# Patient Record
Sex: MALE | Race: WHITE | NOT HISPANIC OR LATINO | Employment: OTHER | URBAN - METROPOLITAN AREA
[De-identification: names, ages, dates, MRNs, and addresses within clinical notes are randomized per-mention and may not be internally consistent; named-entity substitution may affect disease eponyms.]

---

## 2017-11-17 ENCOUNTER — ANESTHESIA - HEALTHEAST (OUTPATIENT)
Dept: CARDIOLOGY | Facility: CLINIC | Age: 62
End: 2017-11-17

## 2017-11-20 ENCOUNTER — COMMUNICATION - HEALTHEAST (OUTPATIENT)
Dept: CARDIOLOGY | Facility: CLINIC | Age: 62
End: 2017-11-20

## 2017-11-22 ENCOUNTER — COMMUNICATION - HEALTHEAST (OUTPATIENT)
Dept: CARDIOLOGY | Facility: CLINIC | Age: 62
End: 2017-11-22

## 2017-11-22 DIAGNOSIS — I48.0 PAROXYSMAL ATRIAL FIBRILLATION (H): ICD-10-CM

## 2017-11-24 ENCOUNTER — AMBULATORY - HEALTHEAST (OUTPATIENT)
Dept: CARDIOLOGY | Facility: CLINIC | Age: 62
End: 2017-11-24

## 2017-11-24 DIAGNOSIS — I48.0 PAROXYSMAL ATRIAL FIBRILLATION (H): ICD-10-CM

## 2017-11-24 DIAGNOSIS — I48.92 ATRIAL FLUTTER WITH RAPID VENTRICULAR RESPONSE (H): ICD-10-CM

## 2017-11-27 LAB
ATRIAL RATE - MUSE: 63 BPM
DIASTOLIC BLOOD PRESSURE - MUSE: NORMAL MMHG
INTERPRETATION ECG - MUSE: NORMAL
P AXIS - MUSE: 51 DEGREES
PR INTERVAL - MUSE: 202 MS
QRS DURATION - MUSE: 96 MS
QT - MUSE: 430 MS
QTC - MUSE: 440 MS
R AXIS - MUSE: -18 DEGREES
SYSTOLIC BLOOD PRESSURE - MUSE: NORMAL MMHG
T AXIS - MUSE: -6 DEGREES
VENTRICULAR RATE- MUSE: 63 BPM

## 2017-12-12 ENCOUNTER — OFFICE VISIT - HEALTHEAST (OUTPATIENT)
Dept: CARDIOLOGY | Facility: CLINIC | Age: 62
End: 2017-12-12

## 2017-12-12 DIAGNOSIS — I47.19 ECTOPIC ATRIAL TACHYCARDIA (H): ICD-10-CM

## 2017-12-12 DIAGNOSIS — I48.0 PAROXYSMAL ATRIAL FIBRILLATION (H): ICD-10-CM

## 2017-12-12 DIAGNOSIS — Z86.79 STATUS POST ABLATION OF ATRIAL FIBRILLATION: ICD-10-CM

## 2017-12-12 DIAGNOSIS — I10 ESSENTIAL HYPERTENSION: ICD-10-CM

## 2017-12-12 DIAGNOSIS — Z98.890 STATUS POST ABLATION OF ATRIAL FIBRILLATION: ICD-10-CM

## 2018-03-09 ENCOUNTER — RECORDS - HEALTHEAST (OUTPATIENT)
Dept: LAB | Facility: CLINIC | Age: 63
End: 2018-03-09

## 2018-03-09 LAB
ANION GAP SERPL CALCULATED.3IONS-SCNC: 10 MMOL/L (ref 5–18)
BUN SERPL-MCNC: 16 MG/DL (ref 8–22)
CALCIUM SERPL-MCNC: 8.9 MG/DL (ref 8.5–10.5)
CHLORIDE BLD-SCNC: 104 MMOL/L (ref 98–107)
CK SERPL-CCNC: 72 U/L (ref 30–190)
CO2 SERPL-SCNC: 26 MMOL/L (ref 22–31)
CREAT SERPL-MCNC: 1.04 MG/DL (ref 0.7–1.3)
GFR SERPL CREATININE-BSD FRML MDRD: >60 ML/MIN/1.73M2
GLUCOSE BLD-MCNC: 104 MG/DL (ref 70–125)
MAGNESIUM SERPL-MCNC: 1.9 MG/DL (ref 1.8–2.6)
POTASSIUM BLD-SCNC: 4.3 MMOL/L (ref 3.5–5)
SODIUM SERPL-SCNC: 140 MMOL/L (ref 136–145)

## 2018-04-03 ENCOUNTER — COMMUNICATION - HEALTHEAST (OUTPATIENT)
Dept: CARDIOLOGY | Facility: CLINIC | Age: 63
End: 2018-04-03

## 2018-04-03 DIAGNOSIS — I48.0 PAROXYSMAL ATRIAL FIBRILLATION (H): ICD-10-CM

## 2018-04-20 ENCOUNTER — OFFICE VISIT - HEALTHEAST (OUTPATIENT)
Dept: CARDIOLOGY | Facility: CLINIC | Age: 63
End: 2018-04-20

## 2018-04-20 DIAGNOSIS — I47.19 ECTOPIC ATRIAL TACHYCARDIA (H): ICD-10-CM

## 2018-04-20 DIAGNOSIS — I10 ESSENTIAL HYPERTENSION: ICD-10-CM

## 2018-04-20 DIAGNOSIS — I48.0 PAROXYSMAL ATRIAL FIBRILLATION (H): ICD-10-CM

## 2018-04-20 LAB
ATRIAL RATE - MUSE: 65 BPM
DIASTOLIC BLOOD PRESSURE - MUSE: NORMAL MMHG
INTERPRETATION ECG - MUSE: NORMAL
P AXIS - MUSE: 74 DEGREES
PR INTERVAL - MUSE: 202 MS
QRS DURATION - MUSE: 92 MS
QT - MUSE: 426 MS
QTC - MUSE: 443 MS
R AXIS - MUSE: -4 DEGREES
SYSTOLIC BLOOD PRESSURE - MUSE: NORMAL MMHG
T AXIS - MUSE: 24 DEGREES
VENTRICULAR RATE- MUSE: 65 BPM

## 2018-04-20 ASSESSMENT — MIFFLIN-ST. JEOR: SCORE: 2037.71

## 2018-09-06 ENCOUNTER — COMMUNICATION - HEALTHEAST (OUTPATIENT)
Dept: CARDIOLOGY | Facility: CLINIC | Age: 63
End: 2018-09-06

## 2018-09-06 DIAGNOSIS — I48.0 PAROXYSMAL ATRIAL FIBRILLATION (H): ICD-10-CM

## 2018-10-16 ENCOUNTER — RECORDS - HEALTHEAST (OUTPATIENT)
Dept: LAB | Facility: CLINIC | Age: 63
End: 2018-10-16

## 2018-10-16 LAB
CHOLEST SERPL-MCNC: 186 MG/DL
FASTING STATUS PATIENT QL REPORTED: NORMAL
HDLC SERPL-MCNC: 40 MG/DL
LDLC SERPL CALC-MCNC: 127 MG/DL
PSA SERPL-MCNC: 5.4 NG/ML (ref 0–4.5)
TRIGL SERPL-MCNC: 97 MG/DL

## 2019-03-12 ENCOUNTER — RECORDS - HEALTHEAST (OUTPATIENT)
Dept: LAB | Facility: CLINIC | Age: 64
End: 2019-03-12

## 2019-03-12 LAB — PSA SERPL-MCNC: 5 NG/ML (ref 0–4.5)

## 2019-03-13 ENCOUNTER — RECORDS - HEALTHEAST (OUTPATIENT)
Dept: LAB | Facility: CLINIC | Age: 64
End: 2019-03-13

## 2019-03-13 LAB
ALBUMIN SERPL-MCNC: 3.9 G/DL (ref 3.5–5)
ALP SERPL-CCNC: 61 U/L (ref 45–120)
ALT SERPL W P-5'-P-CCNC: 19 U/L (ref 0–45)
ANION GAP SERPL CALCULATED.3IONS-SCNC: 10 MMOL/L (ref 5–18)
AST SERPL W P-5'-P-CCNC: 15 U/L (ref 0–40)
BILIRUB SERPL-MCNC: 0.5 MG/DL (ref 0–1)
BUN SERPL-MCNC: 23 MG/DL (ref 8–22)
CALCIUM SERPL-MCNC: 9.2 MG/DL (ref 8.5–10.5)
CHLORIDE BLD-SCNC: 109 MMOL/L (ref 98–107)
CO2 SERPL-SCNC: 23 MMOL/L (ref 22–31)
CREAT SERPL-MCNC: 1.1 MG/DL (ref 0.7–1.3)
GFR SERPL CREATININE-BSD FRML MDRD: >60 ML/MIN/1.73M2
GLUCOSE BLD-MCNC: 125 MG/DL (ref 70–125)
POTASSIUM BLD-SCNC: 4.7 MMOL/L (ref 3.5–5)
PROT SERPL-MCNC: 6.9 G/DL (ref 6–8)
SODIUM SERPL-SCNC: 142 MMOL/L (ref 136–145)

## 2019-03-22 ENCOUNTER — COMMUNICATION - HEALTHEAST (OUTPATIENT)
Dept: CARDIOLOGY | Facility: CLINIC | Age: 64
End: 2019-03-22

## 2019-03-22 DIAGNOSIS — I48.0 PAROXYSMAL ATRIAL FIBRILLATION (H): ICD-10-CM

## 2019-05-10 ENCOUNTER — AMBULATORY - HEALTHEAST (OUTPATIENT)
Dept: CARDIOLOGY | Facility: CLINIC | Age: 64
End: 2019-05-10

## 2019-05-10 ENCOUNTER — RECORDS - HEALTHEAST (OUTPATIENT)
Dept: ADMINISTRATIVE | Facility: OTHER | Age: 64
End: 2019-05-10

## 2019-07-11 ENCOUNTER — OFFICE VISIT - HEALTHEAST (OUTPATIENT)
Dept: CARDIOLOGY | Facility: CLINIC | Age: 64
End: 2019-07-11

## 2019-07-11 DIAGNOSIS — Z98.890 STATUS POST ABLATION OF ATRIAL FIBRILLATION: ICD-10-CM

## 2019-07-11 DIAGNOSIS — Z79.01 CHRONIC ANTICOAGULATION: ICD-10-CM

## 2019-07-11 DIAGNOSIS — I10 ESSENTIAL HYPERTENSION: ICD-10-CM

## 2019-07-11 DIAGNOSIS — Z86.79 STATUS POST ABLATION OF ATRIAL FIBRILLATION: ICD-10-CM

## 2019-07-11 DIAGNOSIS — I47.19 ECTOPIC ATRIAL TACHYCARDIA (H): ICD-10-CM

## 2019-07-11 DIAGNOSIS — I48.0 PAROXYSMAL ATRIAL FIBRILLATION (H): ICD-10-CM

## 2019-07-11 ASSESSMENT — MIFFLIN-ST. JEOR: SCORE: 2031.6

## 2019-08-30 ENCOUNTER — RECORDS - HEALTHEAST (OUTPATIENT)
Dept: LAB | Facility: CLINIC | Age: 64
End: 2019-08-30

## 2019-08-30 LAB
ALBUMIN SERPL-MCNC: 3.6 G/DL (ref 3.5–5)
ALP SERPL-CCNC: 72 U/L (ref 45–120)
ALT SERPL W P-5'-P-CCNC: 17 U/L (ref 0–45)
ANION GAP SERPL CALCULATED.3IONS-SCNC: 10 MMOL/L (ref 5–18)
AST SERPL W P-5'-P-CCNC: 12 U/L (ref 0–40)
BILIRUB SERPL-MCNC: 0.5 MG/DL (ref 0–1)
BUN SERPL-MCNC: 23 MG/DL (ref 8–22)
CALCIUM SERPL-MCNC: 8.7 MG/DL (ref 8.5–10.5)
CHLORIDE BLD-SCNC: 109 MMOL/L (ref 98–107)
CO2 SERPL-SCNC: 21 MMOL/L (ref 22–31)
CREAT SERPL-MCNC: 0.86 MG/DL (ref 0.7–1.3)
GFR SERPL CREATININE-BSD FRML MDRD: >60 ML/MIN/1.73M2
GLUCOSE BLD-MCNC: 109 MG/DL (ref 70–125)
POTASSIUM BLD-SCNC: 4.1 MMOL/L (ref 3.5–5)
PROT SERPL-MCNC: 6.7 G/DL (ref 6–8)
PSA SERPL-MCNC: 4.8 NG/ML (ref 0–4.5)
SODIUM SERPL-SCNC: 140 MMOL/L (ref 136–145)

## 2019-09-27 ENCOUNTER — RECORDS - HEALTHEAST (OUTPATIENT)
Dept: LAB | Facility: CLINIC | Age: 64
End: 2019-09-27

## 2019-09-27 LAB — ERYTHROCYTE [SEDIMENTATION RATE] IN BLOOD BY WESTERGREN METHOD: 15 MM/HR (ref 0–15)

## 2019-09-30 LAB — B BURGDOR IGG+IGM SER QL: 0.19 INDEX VALUE

## 2019-10-14 ENCOUNTER — RECORDS - HEALTHEAST (OUTPATIENT)
Dept: ADMINISTRATIVE | Facility: OTHER | Age: 64
End: 2019-10-14

## 2020-01-24 ENCOUNTER — RECORDS - HEALTHEAST (OUTPATIENT)
Dept: LAB | Facility: CLINIC | Age: 65
End: 2020-01-24

## 2020-01-24 LAB
ALBUMIN UR-MCNC: NEGATIVE MG/DL
APPEARANCE UR: CLEAR
BACTERIA #/AREA URNS HPF: ABNORMAL HPF
BILIRUB UR QL STRIP: NEGATIVE
COLOR UR AUTO: YELLOW
GLUCOSE UR STRIP-MCNC: NEGATIVE MG/DL
HGB UR QL STRIP: ABNORMAL
KETONES UR STRIP-MCNC: NEGATIVE MG/DL
LEUKOCYTE ESTERASE UR QL STRIP: NEGATIVE
NITRATE UR QL: NEGATIVE
PH UR STRIP: 6 [PH] (ref 4.5–8)
RBC #/AREA URNS AUTO: ABNORMAL HPF
SP GR UR STRIP: 1.01 (ref 1–1.03)
SQUAMOUS #/AREA URNS AUTO: ABNORMAL LPF
UROBILINOGEN UR STRIP-ACNC: ABNORMAL
WBC #/AREA URNS AUTO: ABNORMAL HPF

## 2020-01-25 LAB — BACTERIA SPEC CULT: NO GROWTH

## 2020-07-17 ENCOUNTER — COMMUNICATION - HEALTHEAST (OUTPATIENT)
Dept: CARDIOLOGY | Facility: CLINIC | Age: 65
End: 2020-07-17

## 2020-07-17 DIAGNOSIS — I48.0 PAROXYSMAL ATRIAL FIBRILLATION (H): ICD-10-CM

## 2020-08-11 ENCOUNTER — OFFICE VISIT - HEALTHEAST (OUTPATIENT)
Dept: CARDIOLOGY | Facility: CLINIC | Age: 65
End: 2020-08-11

## 2020-08-11 DIAGNOSIS — I48.0 PAROXYSMAL ATRIAL FIBRILLATION (H): ICD-10-CM

## 2020-08-25 ENCOUNTER — AMBULATORY - HEALTHEAST (OUTPATIENT)
Dept: CARDIOLOGY | Facility: CLINIC | Age: 65
End: 2020-08-25

## 2020-08-25 ENCOUNTER — RECORDS - HEALTHEAST (OUTPATIENT)
Dept: ADMINISTRATIVE | Facility: OTHER | Age: 65
End: 2020-08-25

## 2020-11-24 ENCOUNTER — RECORDS - HEALTHEAST (OUTPATIENT)
Dept: ADMINISTRATIVE | Facility: OTHER | Age: 65
End: 2020-11-24

## 2020-11-30 ENCOUNTER — RECORDS - HEALTHEAST (OUTPATIENT)
Dept: LAB | Facility: CLINIC | Age: 65
End: 2020-11-30

## 2020-11-30 LAB
ALBUMIN SERPL-MCNC: 3.5 G/DL (ref 3.5–5)
ALP SERPL-CCNC: 94 U/L (ref 45–120)
ALT SERPL W P-5'-P-CCNC: 17 U/L (ref 0–45)
ANION GAP SERPL CALCULATED.3IONS-SCNC: 12 MMOL/L (ref 5–18)
AST SERPL W P-5'-P-CCNC: 13 U/L (ref 0–40)
BILIRUB SERPL-MCNC: 0.3 MG/DL (ref 0–1)
BUN SERPL-MCNC: 27 MG/DL (ref 8–22)
CALCIUM SERPL-MCNC: 8.9 MG/DL (ref 8.5–10.5)
CHLORIDE BLD-SCNC: 105 MMOL/L (ref 98–107)
CHOLEST SERPL-MCNC: 173 MG/DL
CO2 SERPL-SCNC: 23 MMOL/L (ref 22–31)
CREAT SERPL-MCNC: 1.11 MG/DL (ref 0.7–1.3)
FASTING STATUS PATIENT QL REPORTED: ABNORMAL
GFR SERPL CREATININE-BSD FRML MDRD: >60 ML/MIN/1.73M2
GLUCOSE BLD-MCNC: 184 MG/DL (ref 70–125)
HDLC SERPL-MCNC: 35 MG/DL
LDLC SERPL CALC-MCNC: 87 MG/DL
POTASSIUM BLD-SCNC: 5 MMOL/L (ref 3.5–5)
PROT SERPL-MCNC: 6.9 G/DL (ref 6–8)
SODIUM SERPL-SCNC: 140 MMOL/L (ref 136–145)
TRIGL SERPL-MCNC: 253 MG/DL

## 2021-01-27 ENCOUNTER — AMBULATORY - HEALTHEAST (OUTPATIENT)
Dept: SURGERY | Facility: HOSPITAL | Age: 66
End: 2021-01-27

## 2021-01-27 DIAGNOSIS — Z11.59 ENCOUNTER FOR SCREENING FOR OTHER VIRAL DISEASES: ICD-10-CM

## 2021-02-05 ENCOUNTER — RECORDS - HEALTHEAST (OUTPATIENT)
Dept: LAB | Facility: CLINIC | Age: 66
End: 2021-02-05

## 2021-02-05 LAB
ANION GAP SERPL CALCULATED.3IONS-SCNC: 11 MMOL/L (ref 5–18)
BUN SERPL-MCNC: 22 MG/DL (ref 8–22)
CALCIUM SERPL-MCNC: 9.2 MG/DL (ref 8.5–10.5)
CHLORIDE BLD-SCNC: 107 MMOL/L (ref 98–107)
CO2 SERPL-SCNC: 22 MMOL/L (ref 22–31)
CREAT SERPL-MCNC: 0.99 MG/DL (ref 0.7–1.3)
GFR SERPL CREATININE-BSD FRML MDRD: >60 ML/MIN/1.73M2
GLUCOSE BLD-MCNC: 97 MG/DL (ref 70–125)
POTASSIUM BLD-SCNC: 4.1 MMOL/L (ref 3.5–5)
SODIUM SERPL-SCNC: 140 MMOL/L (ref 136–145)

## 2021-02-06 ENCOUNTER — AMBULATORY - HEALTHEAST (OUTPATIENT)
Dept: FAMILY MEDICINE | Facility: CLINIC | Age: 66
End: 2021-02-06

## 2021-02-06 DIAGNOSIS — Z11.59 ENCOUNTER FOR SCREENING FOR OTHER VIRAL DISEASES: ICD-10-CM

## 2021-02-07 LAB
SARS-COV-2 PCR COMMENT: NORMAL
SARS-COV-2 RNA SPEC QL NAA+PROBE: NEGATIVE
SARS-COV-2 VIRUS SPECIMEN SOURCE: NORMAL

## 2021-02-08 ENCOUNTER — COMMUNICATION - HEALTHEAST (OUTPATIENT)
Dept: SCHEDULING | Facility: CLINIC | Age: 66
End: 2021-02-08

## 2021-02-09 ENCOUNTER — ANESTHESIA - HEALTHEAST (OUTPATIENT)
Dept: SURGERY | Facility: HOSPITAL | Age: 66
End: 2021-02-09

## 2021-02-09 ENCOUNTER — SURGERY - HEALTHEAST (OUTPATIENT)
Dept: SURGERY | Facility: HOSPITAL | Age: 66
End: 2021-02-09

## 2021-02-09 ASSESSMENT — MIFFLIN-ST. JEOR
SCORE: 2030.01
SCORE: 1944.73

## 2021-03-31 ENCOUNTER — AMBULATORY - HEALTHEAST (OUTPATIENT)
Dept: CARDIOLOGY | Facility: CLINIC | Age: 66
End: 2021-03-31

## 2021-03-31 ENCOUNTER — RECORDS - HEALTHEAST (OUTPATIENT)
Dept: ADMINISTRATIVE | Facility: OTHER | Age: 66
End: 2021-03-31

## 2021-04-05 ENCOUNTER — OFFICE VISIT - HEALTHEAST (OUTPATIENT)
Dept: CARDIOLOGY | Facility: CLINIC | Age: 66
End: 2021-04-05

## 2021-04-05 DIAGNOSIS — I47.19 ECTOPIC ATRIAL TACHYCARDIA (H): ICD-10-CM

## 2021-04-05 DIAGNOSIS — I10 ESSENTIAL HYPERTENSION: ICD-10-CM

## 2021-04-05 DIAGNOSIS — I48.0 PAROXYSMAL ATRIAL FIBRILLATION (H): ICD-10-CM

## 2021-04-05 DIAGNOSIS — Z86.79 STATUS POST ABLATION OF ATRIAL FIBRILLATION: ICD-10-CM

## 2021-04-05 DIAGNOSIS — Z98.890 STATUS POST ABLATION OF ATRIAL FIBRILLATION: ICD-10-CM

## 2021-04-05 RX ORDER — SOTALOL HYDROCHLORIDE 80 MG/1
80 TABLET ORAL 2 TIMES DAILY
Qty: 180 TABLET | Refills: 3 | Status: SHIPPED | OUTPATIENT
Start: 2021-04-05 | End: 2022-06-14

## 2021-04-05 ASSESSMENT — MIFFLIN-ST. JEOR: SCORE: 2071.74

## 2021-05-24 ENCOUNTER — RECORDS - HEALTHEAST (OUTPATIENT)
Dept: ADMINISTRATIVE | Facility: CLINIC | Age: 66
End: 2021-05-24

## 2021-05-26 ENCOUNTER — RECORDS - HEALTHEAST (OUTPATIENT)
Dept: ADMINISTRATIVE | Facility: CLINIC | Age: 66
End: 2021-05-26

## 2021-05-29 ENCOUNTER — RECORDS - HEALTHEAST (OUTPATIENT)
Dept: ADMINISTRATIVE | Facility: CLINIC | Age: 66
End: 2021-05-29

## 2021-05-30 ENCOUNTER — RECORDS - HEALTHEAST (OUTPATIENT)
Dept: ADMINISTRATIVE | Facility: CLINIC | Age: 66
End: 2021-05-30

## 2021-05-30 NOTE — PATIENT INSTRUCTIONS - HE
Tashi Joy,    It was a pleasure to see you today at the [facility].     My recommendations after this visit include:    Continue current medications.    Consider alternatives to warfarin: Eliquis, Pradaxa, Xarelto.  Call if you want to switch.    Call if A fib increases in frequency or duration.    Follow up in 1 year, or sooner if needed.    Caity Wilson, [facility], Electrophysiology  [phone]  EP nurses [phone]

## 2021-05-31 VITALS — HEIGHT: 71 IN | BODY MASS INDEX: 36.85 KG/M2

## 2021-05-31 VITALS — BODY MASS INDEX: 36.85 KG/M2 | HEIGHT: 71 IN

## 2021-06-01 ENCOUNTER — RECORDS - HEALTHEAST (OUTPATIENT)
Dept: ADMINISTRATIVE | Facility: CLINIC | Age: 66
End: 2021-06-01

## 2021-06-01 VITALS — WEIGHT: 270 LBS | HEIGHT: 72 IN | BODY MASS INDEX: 36.57 KG/M2

## 2021-06-02 ENCOUNTER — RECORDS - HEALTHEAST (OUTPATIENT)
Dept: ADMINISTRATIVE | Facility: CLINIC | Age: 66
End: 2021-06-02

## 2021-06-03 VITALS — WEIGHT: 274 LBS | HEIGHT: 70 IN | BODY MASS INDEX: 39.22 KG/M2

## 2021-06-05 VITALS
OXYGEN SATURATION: 94 % | BODY MASS INDEX: 39.34 KG/M2 | WEIGHT: 281 LBS | SYSTOLIC BLOOD PRESSURE: 150 MMHG | DIASTOLIC BLOOD PRESSURE: 88 MMHG | HEART RATE: 76 BPM | RESPIRATION RATE: 16 BRPM | HEIGHT: 71 IN

## 2021-06-05 VITALS — WEIGHT: 271.8 LBS | HEIGHT: 71 IN | BODY MASS INDEX: 38.05 KG/M2

## 2021-06-13 ENCOUNTER — HEALTH MAINTENANCE LETTER (OUTPATIENT)
Age: 66
End: 2021-06-13

## 2021-06-14 NOTE — ANESTHESIA PREPROCEDURE EVALUATION
Anesthesia Evaluation      Patient summary reviewed   No history of anesthetic complications     Airway   Comment: Unable to assess.     Pulmonary - negative ROS and normal exam                          Cardiovascular   (+) hypertension, dysrhythmias, ,     ECG reviewed  Rhythm: irregular  Rate: abnormal,         Neuro/Psych    (+) neuromuscular disease,  depression,     Endo/Other    (+) arthritis,      GI/Hepatic/Renal    (+) GERD,        Other findings: Esophageal reflux     Arthritis    Paroxysmal Atrial Fibrillation    Hypertension    Status post ablation of atrial fibrillation    Lumbar spine tumor    Schwannoma    DVT (deep venous thrombosis), left    Heterozygous factor V Leiden mutation    Mobitz type 2 second degree heart block    Chronic anticoagulation    Atrial flutter with rapid ventricular response          Dental                         Anesthesia Plan  Planned anesthetic: general mask  Patient in RVR AF with unstable BP.  Decision to proceed because of emergent situation.  ASA 3 - emergent   Induction: intravenous   Anesthetic plan and risks discussed with: patient    Post-op plan: routine recovery

## 2021-06-14 NOTE — PROGRESS NOTES
Pt comes to clinic for evaluation of QT after initiation of Sotalol 80 mg BID on 11-18-17.  EKG shows SR @63, , QTc 441.  Pt states he is feeling fatigued but otherwise ok on Sotalol.    Pt to f/u with EP NP or Dr. Yañez in 2-3 weeks.  Pt is very upset with his recent admission and his cardioversion.  Spent a significant amount of time going through his chart and trying to reassure him.    Have requested that Dr. Yañez discuss this further with the patient.

## 2021-06-14 NOTE — PROGRESS NOTES
"[facility]  Arrhythmia Clinic  Dennis Yañez    Referring:  ref. provider found     Assessment:         Ectopic atrial tachycardia: The patient was recently admitted with recurrence of an ectopic atrial tachycardia.  I cannot exclude completely exclude an atypical atrial flutter however given the late recurrence following complex left and right atrial ablation (> 3 years) I think favors primary atrial tachycardia as the underlying mechanism.  The patient presented with 2:1 block but then ultimately demonstrated 1:1 conduction and required urgent cardioversion.  The patient's medications were changed and he is tolerated the sotalol recently well with only mild side effects at this point.  He has not had any recurrence of any sustained tachyarrhythmia.    Hypertension: The patient's blood pressure is at target on his current medical therapy.      Recommendations:    Continue warfarin and sotalol therapy at this time.    24-hour Holter monitor to assess the patient's heart rate response to activities of daily living    Follow-up in the A. fib clinic with the EP nurse practitioner in 3 months.  The patient will contact Francheska Herring RN if he has any recurrence in the interim.      Patient Active Problem List   Diagnosis     Esophageal reflux     Arthritis     Paroxysmal Atrial Fibrillation     Hypertension     Status post ablation of atrial fibrillation     Lumbar spine tumor     Schwannoma     DVT (deep venous thrombosis), left     Heterozygous factor V Leiden mutation     Mobitz type 2 second degree heart block     Chronic anticoagulation     Ectopic atrial tachycardia       Subjective:  Tashi Joy (62 y.o. male) returns to the arrhythmia clinic for interval follow-up of his atrial dysrhythmias following his recent discharge and cardioversion.  The patient notes that he has tolerated the sotalol well with some mild symptoms of feeling \"tired\".  Patient also notes that his heart rates in " "the 50s when he is getting ready to go to bed.  He also reports some mild constipation.  Otherwise he has had no presyncope or syncope.  He has had no recurrent tachypalpitations.  He reports no other new change in his general health care status following his recent discharge.    Current Outpatient Prescriptions   Medication Sig Dispense Refill     aspirin 325 MG tablet Take 325 mg by mouth daily.       ranitidine (ZANTAC) 150 MG tablet Take 150 mg by mouth as needed for heartburn.       sotalol (BETAPACE) 80 MG tablet Take 1 tablet (80 mg total) by mouth every 12 (twelve) hours. 60 tablet 3     warfarin (COUMADIN) 6 MG tablet Take 9 mg by mouth See Admin Instructions. Take 9 mg (1.5 tabs) by mouth daily. Adjust dose based on INR results as directed.       No current facility-administered medications for this visit.        Review of Systems:   General: WNL  Eyes: WNL  Ears/Nose/Throat: WNL  Lungs: WNL  Heart: WNL  Stomach: Constipation  Bladder: Frequent Urination at Night  Muscle/Joints: Joint Pain  Skin: WNL  Nervous System: WNL  Mental Health: WNL     Blood: WNL    Family History  Family History   Problem Relation Age of Onset     Glaucoma Father      age 100     Atrial fibrillation Brother        Social History   reports that he quit smoking about 31 years ago. He has a 10.00 pack-year smoking history. He has never used smokeless tobacco. He reports that he does not drink alcohol or use illicit drugs.    Objective:   Vital signs in last 24 hours:  /78 (Patient Site: Left Arm, Patient Position: Sitting, Cuff Size: Adult Large)  Pulse 68  Resp 16  Ht 5' 11\" (1.803 m)  Weight:       Physical Exam:  General: The patient is alert oriented to person place and situation.  The patient is in no acute distress at the time of my evaluation.  Eyes: Pupils are equal, round, and reactive to light.  Conjunctiva and sclera are clear.  ENT: Oral mucosa is moist and without redness. No evident nasal " discharge.  Pulmonary: Lungs are clear bilaterally with no rales, rhonchi, or wheezes.    Cardiovascular exam: Rhythm is regular. S1 and S2 are normal. No significant murmur is present. JVP is normal. Lower extremities demonstrate no significant edema. Distal pulses are intact bilaterally.  Abdomen is obese, soft, and nontender.  Musculoskeletal: Spine is straight. Extremities without deformity.  Neuro: Gait is normal.   Skin is warm, dry, and otherwise intact.      Cardiographics:   Twelve-lead EKG after initiating sotalol demonstrates sinus rhythm and no significant QT prolongation.    Lab Results:   Lab Results   Component Value Date     11/17/2017    K 4.3 11/18/2017     11/17/2017    CO2 21 (L) 11/17/2017    BUN 17 11/17/2017    CREATININE 1.01 11/17/2017    CALCIUM 8.8 11/17/2017     Lab Results   Component Value Date    WBC 7.3 11/17/2017    WBC 9.4 08/24/2015    HGB 16.2 11/17/2017    HCT 50.7 11/17/2017    MCV 90 11/17/2017     11/17/2017     Lab Results   Component Value Date    INR 3.17 (H) 11/18/2017         Outside record review:

## 2021-06-14 NOTE — ANESTHESIA POSTPROCEDURE EVALUATION
Patient: Tashi Joy  cardioversion  Anesthesia type: general    Patient location: Telemetry/Step Down Unit  Last vitals: There were no vitals filed for this visit.  Post vital signs: stable  Level of consciousness: awake and responds to simple questions  Post-anesthesia pain: pain controlled  Post-anesthesia nausea and vomiting: no  Pulmonary: unassisted, return to baseline  Cardiovascular: stable and blood pressure at baseline  Hydration: adequate  Anesthetic events: no    QCDR Measures:  ASA# 11 - Syl-op Cardiac Arrest: ASA11B - Patient did NOT experience unanticipated cardiac arrest  ASA# 12 - Syl-op Mortality Rate: ASA12B - Patient did NOT die  ASA# 13 - PACU Re-Intubation Rate: NA - No ETT / LMA used for case  ASA# 10 - Composite Anes Safety: ASA10A - No serious adverse event    Additional Notes:

## 2021-06-14 NOTE — ANESTHESIA CARE TRANSFER NOTE
Last vitals:   Vitals:    11/17/17 0848   BP: 98/71   Pulse: 74   Resp: 18   Temp:    SpO2: 100%     Patient's level of consciousness is drowsy  Spontaneous respirations: yes  Maintains airway independently: yes  Dentition unchanged: yes  Oropharynx: oropharynx clear of all foreign objects    QCDR Measures:  ASA# 20 - Surgical No Data Recorded  PQRS# 430 - Adult PONV Prevention: NA - Not adult patient, not GA or 3 or more risk factors NOT present  ASA# 8 - Peds PONV Prevention: NA - Not pediatric patient, not GA or 2 or more risk factors NOT present  PQRS# 424 - Syl-op Temp Management: NA - MAC anesthesia or case < 60 minutes  PQRS# 426 - PACU Transfer Protocol:NA - Patient did not go to PACU  ASA# 14 - Acute Post-op Pain: NA - Patient under age 10y or did not go to PACU

## 2021-06-15 NOTE — ANESTHESIA POSTPROCEDURE EVALUATION
Patient: Tashi Joy  Procedure(s):  CYSTOSCOPY TRANSURETHRAL RESECTION OF THE PROSTATE  CYSTOSCOPY LITHOLAPAXY WITH JOESPH LASER  Anesthesia type: general    Patient location: PACU  Last vitals:   Vitals Value Taken Time   /75 02/09/21 2040   Temp 36.7  C (98  F) 02/09/21 2040   Pulse 57 02/09/21 2048   Resp 11 02/09/21 2048   SpO2 98 % 02/09/21 2048   Vitals shown include unvalidated device data.  Post vital signs: stable  Level of consciousness: awake and responds to simple questions  Post-anesthesia pain: pain controlled  Post-anesthesia nausea and vomiting: no  Pulmonary: unassisted, spontaneous ventilation, nasal cannula  Cardiovascular: stable and blood pressure at baseline  Hydration: adequate  Anesthetic events: no    QCDR Measures:  ASA# 11 - Syl-op Cardiac Arrest: ASA11B - Patient did NOT experience unanticipated cardiac arrest  ASA# 12 - Syl-op Mortality Rate: ASA12B - Patient did NOT die  ASA# 13 - PACU Re-Intubation Rate: ASA13B - Patient did NOT require a new airway mgmt  ASA# 10 - Composite Anes Safety: ASA10A - No serious adverse event    Additional Notes:

## 2021-06-15 NOTE — ANESTHESIA PREPROCEDURE EVALUATION
Anesthesia Evaluation      Patient summary reviewed   No history of anesthetic complications     Airway   Mallampati: II  Neck ROM: full   Pulmonary - negative ROS and normal exam                          Cardiovascular - normal exam  Exercise tolerance: > or = 4 METS  (+) hypertension well controlled, dysrhythmias (a fib), ,     ECG reviewed        Neuro/Psych      Comments: S/p lumbar schwannoma excision    Endo/Other    (+) obesity (bmi 35),      GI/Hepatic/Renal    (+) GERD well controlled and intermittent,        Other findings: H/O DVT and Factor V Leiden - on chronic coumadin, now held    Ate solids (meat) at 0900, clears at 1000. Case delayed until 1700.      Dental - normal exam                        Anesthesia Plan  Planned anesthetic: general LMA  Versed/fentanyl/propofol  Ketamine PRN  Decadron/zofran    ASA 3   Induction: intravenous   Anesthetic plan and risks discussed with: patient  Anesthesia plan special considerations: antiemetics,   Post-op plan: routine recovery      2/6/21 covid pcr negative    Results for orders placed or performed during the hospital encounter of 02/09/21   INR (ON DAY OF SURGERY)   Result Value Ref Range    INR 1.08 0.90 - 1.10       Chemistry        Component Value Date/Time     02/05/2021 0813    K 4.1 02/05/2021 0813     02/05/2021 0813    CO2 22 02/05/2021 0813    BUN 22 02/05/2021 0813    CREATININE 0.99 02/05/2021 0813    GLU 97 02/05/2021 0813        Component Value Date/Time    CALCIUM 9.2 02/05/2021 0813    ALKPHOS 94 11/30/2020 1633    AST 13 11/30/2020 1633    ALT 17 11/30/2020 1633    BILITOT 0.3 11/30/2020 1633        Lab Results   Component Value Date    WBC 5.5 02/14/2020    HGB 14.0 02/14/2020    HCT 44.4 02/14/2020    MCV 88 02/14/2020     02/14/2020

## 2021-06-15 NOTE — ANESTHESIA CARE TRANSFER NOTE
Last vitals:   Vitals:    02/09/21 1959   BP: 173/83   Pulse: 61   Resp: 20   Temp: 36.3  C (97.4  F)   SpO2: 99%     Patient's level of consciousness is drowsy  Spontaneous respirations: yes  Maintains airway independently: yes  Dentition unchanged: yes  Oropharynx: oropharynx clear of all foreign objects    QCDR Measures:  ASA# 20 - Surgical Safety Checklist: WHO surgical safety checklist completed prior to induction    PQRS# 430 - Adult PONV Prevention: 4558F - Pt received => 2 anti-emetic agents (different classes) preop & intraop  ASA# 8 - Peds PONV Prevention: NA - Not pediatric patient, not GA or 2 or more risk factors NOT present  PQRS# 424 - Syl-op Temp Management: 4559F - At least one body temp DOCUMENTED => 35.5C or 95.9F within required timeframe  PQRS# 426 - PACU Transfer Protocol: - Transfer of care checklist used  ASA# 14 - Acute Post-op Pain: ASA14B - Patient did NOT experience pain >= 7 out of 10

## 2021-06-16 PROBLEM — I44.1 MOBITZ TYPE 2 SECOND DEGREE HEART BLOCK: Status: ACTIVE | Noted: 2017-11-16

## 2021-06-16 PROBLEM — Z79.01 CHRONIC ANTICOAGULATION: Status: ACTIVE | Noted: 2017-11-16

## 2021-06-16 NOTE — PATIENT INSTRUCTIONS - HE
Tashi Joy,    It was a pleasure to see you today at the [facility].     My recommendations after this visit include:    Continue sotalol 80 mg twice daily  Continue warfarin to decrease stroke risk    Stop taking aspirin if ok with urologist    Follow up in 6 months    Caity Wilson, CNP  [facility], Electrophysiology  [phone]  EP nurses [phone]

## 2021-06-26 NOTE — PROGRESS NOTES
Progress Notes by Caity Wilson CNP at 4/20/2018  8:30 AM     Author: Caity Wilson CNP Service: -- Author Type: Nurse Practitioner    Filed: 4/20/2018 10:48 AM Encounter Date: 4/20/2018 Status: Signed    : Caity Wilson CNP (Nurse Practitioner)           Click to link to [facility]     [facility] HEART CARE NOTE      Assessment/Recommendations   Assessment/Plan:    1.  Atrial fibrillation, ectopic atrial tachycardia: No symptomatology or evidence of recurrence of atrial tachycardia since November 2017.  No evidence of recurrence of atrial fibrillation since complex left and right atrial ablation in January 2014.  We discussed doing a 24-hour Holter monitor to assess his heart rate response to activity, but he declined.  EKG done today for QTc interval monitoring which remains within safety parameters.  Continue sotalol 80 mg twice daily.  He remains on long-term anticoagulation with warfarin.    2.  Hypertension: Blood pressure somewhat elevated today, but has been consistently at target, so no medication changes recommended at this time.    Follow up with Dr. Yañez in 6 months.     History of Present Illness    Mr. Tashi Joy is a  62 y.o. male who comes in today for EP follow-up of atrial arrhythmias.  He has a history of paroxysmal atrial fibrillation since 2002.  He failed antiarrhythmic therapy with flecainide and sotalol.  He underwent pulmonary vein isolation ablation in July 2011 with a repeat, complex left and right atrial ablation in January 2014.  He has had no recurrence of atrial fibrillation, but has had recurrence of ectopic atrial tachycardia.  He was hospitalized in November 2017 with atrial tachycardia, initially presenting with a 2:1 block, but ultimately underwent urgent cardioversion for 1:1 conduction associated with profound hypotension.  He was started on sotalol 80 mg twice daily.  He also has a history of hypertension and heterozygous factor V Leiden mutation.   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  Since that time, he has had chronic left leg edema.  He remains on long-term anticoagulation with warfarin.     He states that he has had no recurrence of arrhythmia and that overall he is feeling well.  He states that his heart rates have been stable and he increases appropriately with activity.  His primary complaint is that his left foot hurts when he steps on it and has been somewhat swollen; he wonders if he broke a bone in his foot.  He also has a pain behind his left knee.  He was referred to his PMD for further evaluation.  He denies chest discomfort, palpitations, abdominal bloating/fullness, shortness of breath, paroxysmal nocturnal dyspnea, orthopnea, lightheadedness, dizziness, presyncope, or syncope.    Cardiographics (personally reviewed):  EKG, Done 4/20/2018: Sinus rhythm at 65 bpm, QT/QTc interval measures 426/443 ms  EKG done 11/16/2017 shows atrial flutter versus atrial tachycardia with 2:1 block at 107 bpm    ECHO, Done 11/17/2017:     When compared to the previous study dated 2/11/2011, no significant change    Left ventricle ejection fraction is normal. The estimated left ventricular ejection fraction is 55%.    No significant valvular abnormalities       Physical Examination Review of Systems   Vitals:    04/20/18 0832   BP: 142/84   Pulse: 66   Resp: 20     Body mass index is 36.62 kg/(m^2).  Wt Readings from Last 3 Encounters:   04/20/18 (!) 270 lb (122.5 kg)   11/18/17 (!) 264 lb 3.2 oz (119.8 kg)   11/12/15 (!) 230 lb (104.3 kg)     General Appearance:   Alert, well-appearing and in no acute distress.   HEENT: Atraumatic, normocephalic.  No scleral icterus, normal conjunctivae, EOM intact, PERRL; mucous membranes pink and moist.     Chest: Chest symmetric, spine straight.   Lungs:   Respirations unlabored: Lungs are clear to auscultation.   Cardiovascular:   Normal first and second heart sounds  with no murmurs, rubs, or gallops.  Regular rate and rhythm.  Radial and posterior tibial pulses are intact, chronic nonpitting left leg edema.   Abdomen:  Soft, nontender, nondistended, bowel sounds present   Extremities: No cyanosis or clubbing   Musculoskeletal Moves all extremities   Skin: Warm, dry, intact.    Neurologic: Mood and affect are appropriate, alert and oriented to person, place, time, and situation    General: WNL  Eyes: WNL  Ears/Nose/Throat: WNL  Lungs: WNL  Heart: Leg Swelling  Stomach: Constipation  Bladder: WNL  Muscle/Joints: Joint Pain, Muscle Pain  Skin: WNL  Nervous System: WNL  Mental Health: WNL     Blood: WNL     Medical History  Surgical History Family History Social History   Past Medical History:   Diagnosis Date   ? Arthritis    ? Depression    ? DVT (deep venous thrombosis)    ? GERD (gastroesophageal reflux disease)     past hx   ? Hematuria    ? Heterozygous factor V Leiden mutation    ? Numbness     in toes of both feet at night   ? Pain of left leg     from back issues   ? Paroxysmal Atrial Fibrillation     Symptomatic.  Onset 2002 [id number] risk score = 1 (HTN) Failed sotalol and off flecanide with PVI PVI July 2011 (RF - PVI due to large LSPV 28X10 mm difficult right vein isolation) Re do PVI + lines Jan 2014     ? Schwannoma     lumbar radical    Past Surgical History:   Procedure Laterality Date   ? EYE SURGERY Left 4/2004    removal of ptergium   ? HERNIA REPAIR     ? KNEE ARTHROSCOPY Right    ? WI ABLATE HEART DYSRHYTHM FOCUS      Description: Catheter Ablation Atrial Fibrillation;  Recorded: 04/29/2014;  Comments: PVI July 2011 (RF ablation  LSPV [id number] on MRI- [facility]:  difficult right vein isolation); ; Re do PVI Jan 29, 2014 (PVI - R cryoballoon + CFE + Roof line + CHARANJIT line + CTI line)   ? WI MENDEZ W/O FACETEC FORAMOT/DSKC 1/2 VRT SEG, THORACIC N/A 8/13/2015    Procedure: LAMINOPLASTY L4, LAMINECTOMY L3-L5 FOR REMOVAL OF INTRADURAL TUMOR WITH SSEP MONITORING & EMG;   Surgeon: Rosmery Conley MD;  Location: [facility] Main OR;  Service: Spine   ? ROTATOR CUFF REPAIR Right     Family History   Problem Relation Age of Onset   ? Glaucoma Father      age 100   ? Atrial fibrillation Brother     Social History     Social History   ? Marital status: Single     Spouse name: N/A   ? Number of children: N/A   ? Years of education: N/A     Occupational History   ? Not on file.     Social History Main Topics   ? Smoking status: Former Smoker     Packs/day: 1.00     Years: 10.00     Quit date: 8/11/1986   ? Smokeless tobacco: Never Used   ? Alcohol use No   ? Drug use: No   ? Sexual activity: Not Currently     Other Topics Concern   ? Not on file     Social History Narrative          Medications  Allergies   Current Outpatient Prescriptions   Medication Sig Dispense Refill   ? aspirin 325 MG tablet Take 325 mg by mouth daily.     ? ranitidine (ZANTAC) 150 MG tablet Take 150 mg by mouth as needed for heartburn.     ? sotalol (BETAPACE) 80 MG tablet Take 1 tablet (80 mg total) by mouth every 12 (twelve) hours. 180 tablet 1   ? warfarin (COUMADIN) 6 MG tablet Take 9 mg by mouth See Admin Instructions. Take 9 mg (1.5 tabs) by mouth daily. Adjust dose based on INR results as directed.       No current facility-administered medications for this visit.       Allergies   Allergen Reactions   ? Penicillins Diarrhea      Medical, surgical, family, social history, and medications were all reviewed and updated as necessary.   Lab Results    Chemistry CBC INR   Lab Results   Component Value Date    CREATININE 1.04 03/09/2018    BUN 16 03/09/2018     03/09/2018    K 4.3 03/09/2018     03/09/2018    CO2 26 03/09/2018     Creatinine (mg/dL)   Date Value   03/09/2018 1.04   11/17/2017 1.01   11/15/2017 0.85   05/23/2017 0.96    Lab Results   Component Value Date    WBC 7.3 11/17/2017    HGB 16.2 11/17/2017    HCT 50.7 11/17/2017    MCV 90 11/17/2017     11/17/2017    Lab Results    Component Value Date    INR 3.17 (H) 11/18/2017            Greater than than 30 minutes were spent face to face in this visit with more than 50% spent discussing diagnoses as listed above, counseling, and coordination of care.    This note has been dictated using voice recognition software. Any grammatical, typographical, or context distortions are unintentional and inherent to the software.    Caity Wilson, [facility]   Electrophysiology

## 2021-06-27 NOTE — PROGRESS NOTES
Progress Notes by Caity Wilson CNP at 7/11/2019  3:30 PM     Author: Caity Wilson CNP Service: -- Author Type: Nurse Practitioner    Filed: 7/11/2019  4:28 PM Encounter Date: 7/11/2019 Status: Signed    : Caity Wilson CNP (Nurse Practitioner)           Click to link to [facility] HEART [facility] ELECTROPHYSIOLOGY NOTE      Assessment/Recommendations   Assessment/Plan:    1.  Atrial fibrillation, ectopic atrial tachycardia: No symptomatology or evidence of recurrence of atrial tachycardia since November 2017.  No evidence of recurrence of atrial fibrillation since complex left and right atrial ablation in January 2014.  We discussed treatment options of antiarrhythmic medications versus repeat ablation.  He prefers to continue with medications at this time.  Recent EKG shows QTc interval within safety parameters.  Continue sotalol 80 mg twice daily.      He was reassured that atrial rhythm he is are not life-threatening, but carries increased risk for stroke.  He has a [id number] score of 2 for age 65-74 and hypertension, though this is higher due to factor V Leiden deficiency.  Continue long-term anticoagulation with warfarin which is managed through his primary clinic.  We discussed alternatives of Eliquis, Pradaxa, and Xarelto.  He was given information to review at home and instructed to call if he is interested in switching.    2.  Hypertension: Blood pressure mildly elevated today, but has been consistently at target.  Continue sotalol as above.    Follow up in 1 year.     History of Present Illness    Mr. Tashi Joy is a  63 y.o. male who comes in today for EP follow-up of atrial arrhythmias.  He has a history of paroxysmal atrial fibrillation since 2002.  He failed antiarrhythmic therapy with flecainide and sotalol.  He underwent pulmonary vein isolation ablation in July 2011 with a repeat, complex left and right atrial ablation in January 2014.  He has had no  recurrence of atrial fibrillation, but has had recurrence of ectopic atrial tachycardia.  He was hospitalized in November 2017 with atrial tachycardia, initially presenting with a 2:1 block, but ultimately underwent urgent cardioversion for 1:1 conduction associated with profound hypotension.  He was started on sotalol 80 mg twice daily.  He also has a history of hypertension and heterozygous factor V Leiden mutation.  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  Since that time, he has had chronic left leg edema.  He remains on long-term anticoagulation with warfarin.     He states that he has been feeling well.  He has had no recurrence of arrhythmia.  He states that his heart rates have been stable and he increases appropriately with activity.   He denies chest discomfort, palpitations, abdominal bloating/fullness, shortness of breath, paroxysmal nocturnal dyspnea, orthopnea, lightheadedness, dizziness, presyncope, or syncope.    Cardiographics (personally reviewed):  EKG, Done 3/12/2019: Sinus rhythm at 65 bpm, QT/QTc interval measures 412/420 ms  EKG done 11/16/2017 shows atrial flutter versus atrial tachycardia with 2:1 block at 107 bpm    ECHO, Done 11/17/2017:     When compared to the previous study dated 2/11/2011, no significant change    Left ventricle ejection fraction is normal. The estimated left ventricular ejection fraction is 55%.    No significant valvular abnormalities       Physical Examination Review of Systems   Vitals:    07/11/19 1529   BP: 140/84   Pulse: 84   Resp: 16     Body mass index is 38.79 kg/m .  Wt Readings from Last 3 Encounters:   07/11/19 (!) 274 lb (124.3 kg)   12/10/18 (!) 260 lb (117.9 kg)   04/20/18 (!) 270 lb (122.5 kg)     General Appearance:   Alert, well-appearing and in no acute distress.   HEENT: Atraumatic, normocephalic.  No scleral icterus, normal conjunctivae, EOM intact, PERRL; mucous membranes pink and moist.      Chest: Chest symmetric, spine straight.   Lungs:   Respirations unlabored: Lungs are clear to auscultation.   Cardiovascular:   Normal first and second heart sounds with no murmurs, rubs, or gallops.  Regular rate and rhythm.  Radial and posterior tibial pulses are intact, chronic nonpitting left leg edema.   Abdomen:  Soft, nontender, nondistended, bowel sounds present   Extremities: No cyanosis or clubbing   Musculoskeletal Moves all extremities   Skin: Warm, dry, intact.    Neurologic: Mood and affect are appropriate, alert and oriented to person, place, time, and situation    General: Weight Gain  Eyes: WNL  Ears/Nose/Throat: Hearing Loss  Lungs: Wheezing  Heart: Leg Swelling  Stomach: WNL  Bladder: Frequent Urination at Night  Muscle/Joints: Muscle Pain  Skin: WNL  Nervous System: WNL  Mental Health: WNL     Blood: WNL     Medical History  Surgical History Family History Social History   Past Medical History:   Diagnosis Date   ? Arthritis    ? Depression    ? DVT (deep venous thrombosis) (H)    ? GERD (gastroesophageal reflux disease)     past hx   ? Hematuria    ? Heterozygous factor V Leiden mutation (H)    ? Numbness     in toes of both feet at night   ? Pain of left leg     from back issues   ? Paroxysmal Atrial Fibrillation     Symptomatic.  Onset 2002 [id number] risk score = 1 (HTN) Failed sotalol and off flecanide with PVI PVI July 2011 (RF - PVI due to large LSPV 28X10 mm difficult right vein isolation) Re do PVI + lines Jan 2014     ? Schwannoma     lumbar radical    Past Surgical History:   Procedure Laterality Date   ? EYE SURGERY Left 4/2004    removal of ptergium   ? HERNIA REPAIR     ? KNEE ARTHROSCOPY Right    ? MN ABLATE HEART DYSRHYTHM FOCUS      Description: Catheter Ablation Atrial Fibrillation;  Recorded: 04/29/2014;  Comments: PVI July 2011 (RF ablation  LSPV [id number] on MRI- [facility]:  difficult right vein isolation); ; Re do PVI Jan 29, 2014 (PVI - R cryoballoon + CFE + Roof line + CHARANJIT  line + CTI line)   ? AL MENDEZ W/O FACETEC FORAMOT/[facility] / VRT SEG, THORACIC N/A 2015    Procedure: LAMINOPLASTY L4, LAMINECTOMY L3-L5 FOR REMOVAL OF INTRADURAL TUMOR WITH SSEP MONITORING & EMG;  Surgeon: Rosmery Conley MD;  Location: [facility] OR;  Service: Spine   ? ROTATOR CUFF REPAIR Right     Family History   Problem Relation Age of Onset   ? Glaucoma Father         age 100   ? Atrial fibrillation Brother     Social History     Socioeconomic History   ? Marital status: Single     Spouse name: Not on file   ? Number of children: Not on file   ? Years of education: Not on file   ? Highest education level: Not on file   Occupational History   ? Not on file   Social Needs   ? Financial resource strain: Not on file   ? Food insecurity:     Worry: Not on file     Inability: Not on file   ? Transportation needs:     Medical: Not on file     Non-medical: Not on file   Tobacco Use   ? Smoking status: Former Smoker     Packs/day: 1.00     Years: 10.00     Pack years: 10.00     Last attempt to quit: 1986     Years since quittin.9   ? Smokeless tobacco: Never Used   Substance and Sexual Activity   ? Alcohol use: No   ? Drug use: No   ? Sexual activity: Not Currently   Lifestyle   ? Physical activity:     Days per week: Not on file     Minutes per session: Not on file   ? Stress: Not on file   Relationships   ? Social connections:     Talks on phone: Not on file     Gets together: Not on file     Attends L[employer] service: Not on file     Active member of club or organization: Not on file     Attends meetings of clubs or organizations: Not on file     Relationship status: Not on file   ? Intimate partner violence:     Fear of current or ex partner: Not on file     Emotionally abused: Not on file     Physically abused: Not on file     Forced sexual activity: Not on file   Other Topics Concern   ? Not on file   Social History Narrative   ? Not on file          Medications  Allergies   Current  Outpatient Medications   Medication Sig Dispense Refill   ? sotalol (BETAPACE) 80 MG tablet TAKE 1 TABLET(80 MG) BY MOUTH EVERY 12 HOURS 180 tablet 3   ? warfarin (COUMADIN) 6 MG tablet Take 9 mg by mouth See Admin Instructions. Take 9 mg (1.5 tabs) by mouth daily. Adjust dose based on INR results as directed.     ? ranitidine (ZANTAC) 150 MG tablet Take 150 mg by mouth as needed for heartburn.       No current facility-administered medications for this visit.       Allergies   Allergen Reactions   ? Penicillins Diarrhea      Medical, surgical, family, social history, and medications were all reviewed and updated as necessary.   Lab Results    Chemistry CBC INR   Lab Results   Component Value Date    CREATININE 1.10 03/12/2019    BUN 23 (H) 03/12/2019     03/12/2019    K 4.7 03/12/2019     (H) 03/12/2019    CO2 23 03/12/2019     Creatinine (mg/dL)   Date Value   03/12/2019 1.10   03/09/2018 1.04   11/17/2017 1.01   11/15/2017 0.85    Lab Results   Component Value Date    WBC 7.3 11/17/2017    HGB 16.2 11/17/2017    HCT 50.7 11/17/2017    MCV 90 11/17/2017     11/17/2017    Lab Results   Component Value Date    INR 3.27 (H) 12/10/2018              This note has been dictated using voice recognition software. Any grammatical, typographical, or context distortions are unintentional and inherent to the software.    Caity Wilson, [facility]   Electrophysiology

## 2021-06-30 NOTE — PROGRESS NOTES
Progress Notes by Caity Wilson CNP at 4/5/2021  8:30 AM     Author: Caity Wilson CNP Service: -- Author Type: Nurse Practitioner    Filed: 4/5/2021  9:14 AM Encounter Date: 4/5/2021 Status: Signed    : Caity Wilson CNP (Nurse Practitioner)             Assessment/Recommendations   Assessment/Plan:    1.  Atrial fibrillation, ectopic atrial tachycardia: No symptomatology or evidence of recurrence of atrial tachycardia.  No evidence of recurrence of atrial fibrillation since complex left and right atrial ablation in January 2014.     We previously discussed repeat ablation, but he prefers to continue with medications at this time.  Recent EKG shows QTc interval within safety parameters.  Continue sotalol 80 mg twice daily.       He was reassured that atrial rhythm he is are not life-threatening, but carries increased risk for stroke.  He has a [id number]-AROu score of 2 for age 65-74 and hypertension, though this is higher due to factor V Leiden deficiency.  Continue long-term anticoagulation with warfarin which is managed through his primary clinic.    From a cardiac standpoint, he does not need to be on aspirin as well as warfarin and the aspirin is likely contributing to his GI upset.  Recommend discontinuing aspirin if he has no other reason for remaining on it.     2.  Hypertension: Blood pressure mildly elevated today, but has been consistently at target.  Continue sotalol as above.    Follow up in 6 months       History of Present Illness/Subjective    Mr. Tashi Joy is a 65 y.o. male who comes in today for EP follow-up of atrial fibrillation.  He has a history of paroxysmal atrial fibrillation since 2002.  He failed antiarrhythmic therapy with flecainide and sotalol.  He underwent pulmonary vein isolation ablation in July 2011 with a repeat, complex left and right atrial ablation in January 2014.  He has had no recurrence of atrial fibrillation, but has had recurrence of ectopic atrial  tachycardia.  He was hospitalized in November 2017 with atrial tachycardia, initially presenting with a 2:1 block, but ultimately underwent urgent cardioversion for 1:1 conduction associated with profound hypotension.  He was started on sotalol 80 mg twice daily.  He also has a history of hypertension and heterozygous factor V Leiden mutation.  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  Since that time, he has had chronic left leg edema.  He remains on long-term anticoagulation with warfarin.     Rachel states that he has been feeling well.  He occasionally gets a few seconds of palpitations, but no sustained arrhythmia.    He has gained weight with activity restrictions and Covid restrictions, but has started exercising again.  He denies chest discomfort, palpitations, abdominal bloating/fullness, shortness of breath, paroxysmal nocturnal dyspnea, orthopnea, lightheadedness, dizziness, presyncope, or syncope.     Cardiographics (EKG personally reviewed):  EKG done 2/5/2021 shows Sinus rhythm at 65 bpm, QT/QTc interval measures 430/438 ms  EKG done 11/16/2017 shows atrial flutter versus atrial tachycardia with 2:1 block at 107 bpm     ECHO, Done 3/14/2019 at \[facility]\"":     Left ventricle ejection fraction is normal. The estimated left ventricular ejection fraction is 55-60%.  No significant wall motion abnormalities.  No significant valvular abnormalities    I have reviewed and updated the patient's Past Medical History, Social History, Family History and Medication List.     Physical Examination Review of Systems   Vitals:    04/05/21 0836   BP: 150/88   Pulse: 76   Resp: 16   SpO2: 94%     Body mass index is 39.19 kg/m .  Wt Readings from Last 3 Encounters:   04/05/21 (!) 281 lb (127.5 kg)   02/09/21 (!) 271 lb 12.8 oz (123.3 kg)   02/14/20 (!) 270 lb (122.5 kg)       General Appearance:   Alert, well-appearing and in no acute distress.   HEENT: Atraumatic,  normocephalic.  No scleral icterus, normal conjunctivae, EOMs intact, PERRL.  Wearing a mask.   Chest/Lungs:   Chest symmetric, spine straight.  Respirations unlabored.  Lungs are clear to auscultation.   Cardiovascular:   Regular rate and rhythm.  Normal first and second heart sounds with no murmurs, rubs, or gallops; radial and posterior tibial pulses are intact, chronic nonpitting left lower extremity edema, none on right.   Abdomen:  Soft, nontender, nondistended, bowel sounds present.   Extremities: No cyanosis or clubbing.   Musculoskeletal: Moves all extremities.  Gait steady.   Skin: Warm, dry, intact.    Neurologic: Mood and affect are appropriate.  Alert and oriented to person, place, time, and situation.    General: WNL  Eyes: WNL  Ears/Nose/Throat: WNL  Lungs: Wheezing  Heart: WNL  Stomach: Heartburn  Bladder: WNL  Muscle/Joints: WNL  Skin: WNL  Nervous System: WNL  Mental Health: WNL     Blood: WNL       Medical History  Surgical History Family History Social History   Past Medical History:   Diagnosis Date   ? Arthritis    ? Depression    ? DVT (deep venous thrombosis) (H)    ? GERD (gastroesophageal reflux disease)     past hx   ? Hematuria    ? Heterozygous factor V Leiden mutation (H)    ? Numbness     in toes of both feet at night   ? Pain of left leg     from back issues   ? Paroxysmal Atrial Fibrillation     Symptomatic.  Onset 2002 [id number] risk score = 1 (HTN) Failed sotalol and off flecanide with PVI PVI July 2011 (RF - PVI due to large LSPV 28X10 mm difficult right vein isolation) Re do PVI + lines Jan 2014     ? Schwannoma     lumbar radical    Past Surgical History:   Procedure Laterality Date   ? EYE SURGERY Left 4/2004    removal of ptergium   ? HERNIA REPAIR     ? KNEE ARTHROSCOPY Right    ? [place] ABLATE HEART DYSRHYTHM FOCUS      Description: Catheter Ablation Atrial Fibrillation;  Recorded: 04/29/2014;  Comments: PVI July 2011 (RF ablation  LSPV [id number] on MRI- [facility]:  difficult right  vein isolation); ; Re do PVI 2014 (PVI - R cryoballoon + CFE + Roof line + CHARANJIT line + CTI line)   ? PA MENDEZ W/O FACETEC FORAMOT/[facility]  VRT SEG, THORACIC N/A 2015    Procedure: LAMINOPLASTY L4, LAMINECTOMY L3-L5 FOR REMOVAL OF INTRADURAL TUMOR WITH SSEP MONITORING & EMG;  Surgeon: Rosmery Conley MD;  Location[facility];  Service: Spine   ? PA TRANSURETHRAL ELEC-SURG PROSTATECTOM N/A 2021    Procedure: CYSTOSCOPY TRANSURETHRAL RESECTION OF THE PROSTATE;  Surgeon: Migue Zhang MD;  [place] [facility] [place]  Service: Urology   ? ROTATOR CUFF REPAIR Right     Family History   Problem Relation Age of Onset   ? Glaucoma Father         age 100   ? Atrial fibrillation Brother     Social History     Tobacco Use   ? Smoking status: Former Smoker     Packs/day: 1.00     Years: 10.00     Pack years: 10.00     Quit date: 1986     Years since quittin.6   ? Smokeless tobacco: Never Used   Substance Use Topics   ? Alcohol use: No   ? Drug use: No          Medications  Allergies   Current Outpatient Medications   Medication Sig Dispense Refill   ? aspirin 325 MG tablet Take 1 tablet (325 mg total) by mouth daily.  0   ? sotaloL (BETAPACE) 80 MG tablet TAKE 1 TABLET BY MOUTH TWICE DAILY 180 tablet 0   ? warfarin ANTICOAGULANT (COUMADIN/JANTOVEN) 6 MG tablet Take 1.5 tablets (9 mg total) by mouth See Admin Instructions. Take 9 mg (1.5 tabs) by mouth daily. Adjust dose based on INR results as directed.  0   ? acetaminophen (TYLENOL) 500 MG tablet Take 1-2 tablets (500-1,000 mg total) by mouth every 6 (six) hours as needed for pain.  0   ? bacitracin 500 unit/gram ointment Apply 1 application topically 3 (three) times a day.  0   ? oxyCODONE (ROXICODONE) 5 MG immediate release tablet Take 1-2 tablets (5-10 mg total) by mouth every 6 (six) hours as needed for pain. 15 tablet 0     No current facility-administered medications for this visit.     Allergies   Allergen  Reactions   ? Penicillins Diarrhea         Lab Results    Chemistry/lipid CBC Other   Lab Results   Component Value Date    CREATININE 0.99 02/05/2021    BUN 22 02/05/2021     02/05/2021    K 4.1 02/05/2021     02/05/2021    CO2 22 02/05/2021     Creatinine (mg/dL)   Date Value   02/05/2021 0.99   11/30/2020 1.11   02/14/2020 1.05   08/30/2019 0.86    Lab Results   Component Value Date    WBC 5.5 02/14/2020    HGB 14.0 02/14/2020    HCT 44.4 02/14/2020    MCV 88 02/14/2020     02/14/2020    Lab Results   Component Value Date    CKTOTAL 72 03/09/2018    CKMB 2 11/11/2012    TROPONINI 0.02 11/16/2017    BNP 62 (H) 11/15/2017    TSH 2.34 11/15/2017     Lab Results   Component Value Date    INR 1.08 02/09/2021    INR 2.71 (H) 02/14/2020    INR 3.27 (H) 12/10/2018        This note has been dictated using voice recognition software. Any grammatical, typographical, or context distortions are unintentional and inherent to the software.

## 2021-07-17 ENCOUNTER — LAB REQUISITION (OUTPATIENT)
Dept: LAB | Facility: CLINIC | Age: 66
End: 2021-07-17
Payer: COMMERCIAL

## 2021-07-17 DIAGNOSIS — Z03.818 ENCOUNTER FOR OBSERVATION FOR SUSPECTED EXPOSURE TO OTHER BIOLOGICAL AGENTS RULED OUT: ICD-10-CM

## 2021-07-17 PROCEDURE — U0005 INFEC AGEN DETEC AMPLI PROBE: HCPCS | Performed by: PHYSICIAN ASSISTANT

## 2021-07-17 PROCEDURE — U0003 INFECTIOUS AGENT DETECTION BY NUCLEIC ACID (DNA OR RNA); SEVERE ACUTE RESPIRATORY SYNDROME CORONAVIRUS 2 (SARS-COV-2) (CORONAVIRUS DISEASE [COVID-19]), AMPLIFIED PROBE TECHNIQUE, MAKING USE OF HIGH THROUGHPUT TECHNOLOGIES AS DESCRIBED BY CMS-2020-01-R: HCPCS | Mod: ORL | Performed by: PHYSICIAN ASSISTANT

## 2021-07-18 LAB — SARS-COV-2 RNA RESP QL NAA+PROBE: NEGATIVE

## 2021-08-25 ENCOUNTER — LAB REQUISITION (OUTPATIENT)
Dept: LAB | Facility: CLINIC | Age: 66
End: 2021-08-25
Payer: COMMERCIAL

## 2021-08-25 DIAGNOSIS — Z03.818 ENCOUNTER FOR OBSERVATION FOR SUSPECTED EXPOSURE TO OTHER BIOLOGICAL AGENTS RULED OUT: ICD-10-CM

## 2021-08-25 PROCEDURE — U0003 INFECTIOUS AGENT DETECTION BY NUCLEIC ACID (DNA OR RNA); SEVERE ACUTE RESPIRATORY SYNDROME CORONAVIRUS 2 (SARS-COV-2) (CORONAVIRUS DISEASE [COVID-19]), AMPLIFIED PROBE TECHNIQUE, MAKING USE OF HIGH THROUGHPUT TECHNOLOGIES AS DESCRIBED BY CMS-2020-01-R: HCPCS | Mod: ORL | Performed by: PHYSICIAN ASSISTANT

## 2021-08-26 LAB — SARS-COV-2 RNA RESP QL NAA+PROBE: NEGATIVE

## 2021-10-04 ENCOUNTER — HEALTH MAINTENANCE LETTER (OUTPATIENT)
Age: 66
End: 2021-10-04

## 2022-01-05 ENCOUNTER — LAB REQUISITION (OUTPATIENT)
Dept: LAB | Facility: CLINIC | Age: 67
End: 2022-01-05

## 2022-01-05 DIAGNOSIS — E78.5 HYPERLIPIDEMIA, UNSPECIFIED: ICD-10-CM

## 2022-01-05 LAB
ALBUMIN SERPL-MCNC: 4 G/DL (ref 3.5–5)
ALP SERPL-CCNC: 84 U/L (ref 45–120)
ALT SERPL W P-5'-P-CCNC: 22 U/L (ref 0–45)
ANION GAP SERPL CALCULATED.3IONS-SCNC: 8 MMOL/L (ref 5–18)
AST SERPL W P-5'-P-CCNC: 15 U/L (ref 0–40)
BILIRUB SERPL-MCNC: 0.8 MG/DL (ref 0–1)
BUN SERPL-MCNC: 17 MG/DL (ref 8–22)
CALCIUM SERPL-MCNC: 9.4 MG/DL (ref 8.5–10.5)
CHLORIDE BLD-SCNC: 105 MMOL/L (ref 98–107)
CHOLEST SERPL-MCNC: 212 MG/DL
CO2 SERPL-SCNC: 25 MMOL/L (ref 22–31)
CREAT SERPL-MCNC: 0.97 MG/DL (ref 0.7–1.3)
FASTING STATUS PATIENT QL REPORTED: ABNORMAL
GFR SERPL CREATININE-BSD FRML MDRD: 85 ML/MIN/1.73M2
GLUCOSE BLD-MCNC: 94 MG/DL (ref 70–125)
HDLC SERPL-MCNC: 41 MG/DL
LDLC SERPL CALC-MCNC: 136 MG/DL
POTASSIUM BLD-SCNC: 4.7 MMOL/L (ref 3.5–5)
PROT SERPL-MCNC: 7.2 G/DL (ref 6–8)
SODIUM SERPL-SCNC: 138 MMOL/L (ref 136–145)
TRIGL SERPL-MCNC: 173 MG/DL

## 2022-01-05 PROCEDURE — 80061 LIPID PANEL: CPT | Performed by: PHYSICIAN ASSISTANT

## 2022-01-05 PROCEDURE — 80053 COMPREHEN METABOLIC PANEL: CPT | Performed by: PHYSICIAN ASSISTANT

## 2022-04-15 ENCOUNTER — LAB REQUISITION (OUTPATIENT)
Dept: LAB | Facility: CLINIC | Age: 67
End: 2022-04-15

## 2022-04-15 DIAGNOSIS — I10 ESSENTIAL (PRIMARY) HYPERTENSION: ICD-10-CM

## 2022-04-15 LAB
ANION GAP SERPL CALCULATED.3IONS-SCNC: 8 MMOL/L (ref 5–18)
BUN SERPL-MCNC: 24 MG/DL (ref 8–22)
CALCIUM SERPL-MCNC: 8.9 MG/DL (ref 8.5–10.5)
CHLORIDE BLD-SCNC: 107 MMOL/L (ref 98–107)
CO2 SERPL-SCNC: 27 MMOL/L (ref 22–31)
CREAT SERPL-MCNC: 1.07 MG/DL (ref 0.7–1.3)
GFR SERPL CREATININE-BSD FRML MDRD: 76 ML/MIN/1.73M2
GLUCOSE BLD-MCNC: 123 MG/DL (ref 70–125)
POTASSIUM BLD-SCNC: 4.3 MMOL/L (ref 3.5–5)
SODIUM SERPL-SCNC: 142 MMOL/L (ref 136–145)

## 2022-04-15 PROCEDURE — 80048 BASIC METABOLIC PNL TOTAL CA: CPT | Performed by: FAMILY MEDICINE

## 2022-07-10 ENCOUNTER — HEALTH MAINTENANCE LETTER (OUTPATIENT)
Age: 67
End: 2022-07-10

## 2022-09-11 ENCOUNTER — HEALTH MAINTENANCE LETTER (OUTPATIENT)
Age: 67
End: 2022-09-11

## 2022-09-13 DIAGNOSIS — I48.0 PAROXYSMAL ATRIAL FIBRILLATION (H): ICD-10-CM

## 2022-09-13 RX ORDER — SOTALOL HYDROCHLORIDE 80 MG/1
TABLET ORAL
Qty: 180 TABLET | Refills: 0 | Status: SHIPPED | OUTPATIENT
Start: 2022-09-13 | End: 2022-10-10

## 2022-10-10 ENCOUNTER — OFFICE VISIT (OUTPATIENT)
Dept: CARDIOLOGY | Facility: CLINIC | Age: 67
End: 2022-10-10
Payer: COMMERCIAL

## 2022-10-10 VITALS
SYSTOLIC BLOOD PRESSURE: 146 MMHG | RESPIRATION RATE: 16 BRPM | HEART RATE: 66 BPM | WEIGHT: 274.8 LBS | DIASTOLIC BLOOD PRESSURE: 76 MMHG | HEIGHT: 72 IN | BODY MASS INDEX: 37.22 KG/M2

## 2022-10-10 DIAGNOSIS — I47.19 ECTOPIC ATRIAL TACHYCARDIA (H): ICD-10-CM

## 2022-10-10 DIAGNOSIS — I48.0 PAROXYSMAL ATRIAL FIBRILLATION (H): Primary | ICD-10-CM

## 2022-10-10 DIAGNOSIS — Z79.01 CHRONIC ANTICOAGULATION: ICD-10-CM

## 2022-10-10 DIAGNOSIS — I10 ESSENTIAL HYPERTENSION: ICD-10-CM

## 2022-10-10 LAB
ATRIAL RATE - MUSE: 68 BPM
DIASTOLIC BLOOD PRESSURE - MUSE: NORMAL MMHG
INTERPRETATION ECG - MUSE: NORMAL
P AXIS - MUSE: 72 DEGREES
PR INTERVAL - MUSE: 230 MS
QRS DURATION - MUSE: 92 MS
QT - MUSE: 422 MS
QTC - MUSE: 448 MS
R AXIS - MUSE: -7 DEGREES
SYSTOLIC BLOOD PRESSURE - MUSE: NORMAL MMHG
T AXIS - MUSE: 34 DEGREES
VENTRICULAR RATE- MUSE: 68 BPM

## 2022-10-10 PROCEDURE — 99214 OFFICE O/P EST MOD 30 MIN: CPT | Performed by: NURSE PRACTITIONER

## 2022-10-10 PROCEDURE — 93000 ELECTROCARDIOGRAM COMPLETE: CPT | Performed by: INTERNAL MEDICINE

## 2022-10-10 RX ORDER — AMLODIPINE BESYLATE 5 MG/1
5 TABLET ORAL DAILY
COMMUNITY

## 2022-10-10 RX ORDER — SOTALOL HYDROCHLORIDE 80 MG/1
TABLET ORAL
Qty: 180 TABLET | Refills: 3 | Status: SHIPPED | OUTPATIENT
Start: 2022-10-10 | End: 2023-11-30

## 2022-10-10 NOTE — LETTER
10/10/2022    Physician No Ref-Primary  No address on file    RE: Tashi Joy       Dear Colleague,     I had the pleasure of seeing Tashi Joy in the [facility].    HEART CARE ELECTROPHYSIOLOGY NOTE      [facility]  [phone]      Assessment/Recommendations   Assessment/Plan:  1.  Atrial fibrillation, ectopic atrial tachycardia: No symptomatology or evidence of recurrence of atrial tachycardia.  No evidence of recurrence of atrial fibrillation since complex left and right atrial ablation in January 2014.   We discussed treatment options of antiarrhythmic medications versus repeat ablation, but he prefers to continue with medications at this time.  Recent EKG shows QTc interval within safety parameters.  Continue sotalol 80 mg twice daily.       He was reassured that atrial rhythm he is are not life-threatening, but carries increased risk for stroke.  He has a [id number] score of 2 for age 65-74 and hypertension, though this is higher due to factor V Leiden deficiency.  He switched over to Eliquis while he was in [facility] as he was able to get it affordably.  He denies side effects or missed doses.  He really appreciates not having to get his INRs checked frequently, particularly as he travels.  Continue Eliquis 5 mg twice daily for stroke prophylaxis.     2.  Hypertension: Blood pressure mildly elevated today, but has been consistently at target.  Follow-up with PCP if it continues to be elevated.  Continue amlodipine and sotalol as above.    Follow up in 1 year     History of Present Illness/Subjective    HPI: Tashi Joy is a 66 year old male who comes in today for EP follow-up of atrial arrhythmias.  He has a history of atrial fibrillation and atrial tachycardia, hypertension, and heterozygous factor V Leiden mutation.  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  Since that time, he has had chronic  left leg edema.      He has a history of paroxysmal atrial fibrillation since 2002.  He failed antiarrhythmic therapy with flecainide and sotalol.  He underwent pulmonary vein isolation ablation in July 2011 with a repeat, complex left and right atrial ablation in January 2014.  He has had no recurrence of atrial fibrillation, but has had recurrence of ectopic atrial tachycardia.  He was hospitalized in November 2017 with atrial tachycardia, initially presenting with a 2:1 block, but ultimately underwent urgent cardioversion for 1:1 conduction associated with profound hypotension.  He was started on sotalol 80 mg twice daily.  He was on warfarin for stroke prophylaxis, but while he was in [place] he saw a cardiologist and switched to Eliquis as he can obtain it affordably there.     Rachel returned last week from spending 2 months in [place].  He states that he has been feeling well.  He has not had any arrhythmia.  He gained weight while he was in [facility] and expresses intent to work on weight loss.  He denies chest discomfort, palpitations, abdominal bloating/fullness, shortness of breath, paroxysmal nocturnal dyspnea, orthopnea, lightheadedness, dizziness, presyncope, or syncope.     Cardiographics (EKG personally reviewed):  EKG done 10/10/2022 shows sinus rhythm at 68 bpm, QRS 92 ms, QT/QTc interval measures 422/448 ms  EKG done 11/16/2017 shows atrial flutter versus atrial tachycardia with 2:1 block at 107 bpm     ECHO, Done 3/14/2019 at [facility]:   Left ventricle ejection fraction is normal. The estimated left ventricular ejection fraction is 55-60%.  No significant wall motion abnormalities.  No significant valvular abnormalities    I have reviewed and updated the patient's Past Medical History, Social History, Family History and Medication List.  Outside records personally reviewed.     Physical Examination  Review of Systems   Vitals: BP (!) 146/76 (BP Location: Left arm, Patient Position: Sitting, Cuff Size:  "Adult Regular)   Pulse 66   Resp 16   Ht 1.816 m (5' 11.5\")   Wt 124.6 kg (274 lb 12.8 oz)   BMI 37.79 kg/m    BMI= Body mass index is 37.79 kg/m .  Wt Readings from Last 3 Encounters:   10/10/22 124.6 kg (274 lb 12.8 oz)   04/05/21 127.5 kg (281 lb)   02/09/21 123.3 kg (271 lb 12.8 oz)       General Appearance:   Alert, well-appearing and in no acute distress.   HEENT: Atraumatic, normocephalic.  No scleral icterus, normal conjunctivae, EOMs intact, PERRL.  Wearing a mask.   Chest/Lungs:   Chest symmetric, spine straight.  Respirations unlabored.  Lungs are clear to auscultation.   Cardiovascular:   Regular rate and rhythm.  Normal first and second heart sounds with no murmurs, rubs, or gallops; radial and posterior tibial pulses are intact, no edema.   Abdomen:  Soft, nondistended, bowel sounds present.   Extremities: No cyanosis or clubbing.   Musculoskeletal: Moves all extremities.     Skin: Warm, dry, intact.    Neurologic: Mood and affect are appropriate.  Alert and oriented to person, place, time, and situation.     ROS: 10 point ROS neg other than the symptoms noted above in the HPI.         Medical History  Surgical History Family History Social History   Past Medical History:   Diagnosis Date     Arthritis      Atrial fibrillation (H)     Symptomatic.  Onset 2002 [id number] risk score = 1 (HTN) Failed sotalol and off flecanide with PVI PVI July 2011 (RF - PVI due to large LSPV 28X10 mm difficult right vein isolation) Re do PVI + lines Jan 2014       Depression      DVT (deep venous thrombosis) (H)      GERD (gastroesophageal reflux disease)     past hx     Hematuria      Heterozygous factor V Leiden mutation (H)      Numbness     in toes of both feet at night     Pain of left leg     from back issues     Schwannoma     lumbar radical     Past Surgical History:   Procedure Laterality Date     ARTHROSCOPY KNEE Right      ARTHROSCOPY SHOULDER ROTATOR CUFF REPAIR Right      EYE SURGERY Left 4/2004    " removal of ptergium     HC TRANSURETHRAL ELEC-SURG PROSTATECTOM N/A 2021    Procedure: CYSTOSCOPY TRANSURETHRAL RESECTION OF THE PROSTATE;  Surgeon: Migue Zhang MD;  Location: [facility];  Service: Urology     HERNIA REPAIR       IR IVC FILTER PLACEMENT  2015     IR IVC FILTER REMOVAL  2015     IR LUMBAR DRAIN PLACEMENT W FLUORO  2015     SD ABLATE HEART DYSRHYTHM FOCUS      Description: Catheter Ablation Atrial Fibrillation;  Recorded: 2014;  Comments: PVI 2011 (RF ablation  LSPV 36H31mh on MRI- Jarrell:  difficult right vein isolation); ; Re do PVI 2014 (PVI - R cryoballoon + CFE + Roof line + CHARANJIT line + CTI line)     SD MENDEZ W/O FACETEC FORAMOT/DSKC  VRT SEG, THORACIC N/A 2015    Procedure: LAMINOPLASTY L4, LAMINECTOMY L3-L5 FOR REMOVAL OF INTRADURAL TUMOR WITH SSEP MONITORING & EMG;  Surgeon: Rosmery Conley MD;  Location: [facility];  Service: Spine     Family History   Problem Relation Age of Onset     Glaucoma Father         age 100     Atrial fibrillation Brother         Social History     Socioeconomic History     Marital status: Single     Spouse name: Not on file     Number of children: Not on file     Years of education: Not on file     Highest education level: Not on file   Occupational History     Not on file   Tobacco Use     Smoking status: Former     Packs/day: 1.00     Years: 10.00     Pack years: 10.00     Types: Cigarettes     Quit date: 1986     Years since quittin.1     Smokeless tobacco: Never   Substance and Sexual Activity     Alcohol use: No     Drug use: No     Sexual activity: Not Currently   Other Topics Concern     Not on file   Social History Narrative     Not on file     Social Determinants of Health     Financial Resource Strain: Not on file   Food Insecurity: Not on file   Transportation Needs: Not on file   Physical Activity: Not on file   Stress: Not on file   Social Connections: Not on  file   Intimate Partner Violence: Not on file   Housing Stability: Not on file           Medications  Allergies   Current Outpatient Medications   Medication Sig Dispense Refill     acetaminophen (TYLENOL) 500 MG tablet [ACETAMINOPHEN (TYLENOL) 500 MG TABLET] Take 1-2 tablets (500-1,000 mg total) by mouth every 6 (six) hours as needed for pain.  0     amLODIPine (NORVASC) 5 MG tablet Take 1 tablet (5 mg) by mouth daily       apixaban ANTICOAGULANT (ELIQUIS) 5 MG tablet Take 1 tablet (5 mg) by mouth 2 times daily       sotalol (BETAPACE) 80 MG tablet TAKE 1 TABLET(80 MG) BY MOUTH TWICE DAILY 180 tablet 3       Allergies   Allergen Reactions     Penicillins Diarrhea          Lab Results    Chemistry/lipid CBC Cardiac Enzymes/BNP/TSH/INR   Recent Labs   Lab Test 01/05/22  1110   CHOL 212*   HDL 41   *   TRIG 173*     Recent Labs   Lab Test 01/05/22  1110 11/30/20  1633 10/16/18  1112   * 87 127     Recent Labs   Lab Test 04/15/22  1315      POTASSIUM 4.3   CHLORIDE 107   CO2 27      BUN 24*   CR 1.07   GFRESTIMATED 76   FLACA 8.9     Recent Labs   Lab Test 04/15/22  1315 01/05/22  1110 02/05/21  0813   CR 1.07 0.97 0.99      Recent Labs   Lab Test 02/14/20 2119   WBC 5.5   HGB 14.0   HCT 44.4   MCV 88        Recent Labs   Lab Test 02/14/20 2119   HGB 14.0    No results for input(s): TROPONINI in the last 51538 hours.  No results for input(s): BNP, NTBNPI, NTBNP in the last 36663 hours.  No results for input(s): TSH in the last 68240 hours.  Recent Labs   Lab Test 02/09/21  1352 02/14/20  2119 12/10/18  0407   INR 1.08 2.71* 3.27*                                                  Thank you for allowing me to participate in the care of your patient.      Sincerely,     CATINA Burns [facility]  cc:   No referring provider defined for this encounter.

## 2022-10-10 NOTE — LETTER
Date:October 11, 2022      Provider requested that no letter be sent. Do not send.       [facility]

## 2022-10-10 NOTE — PATIENT INSTRUCTIONS
Tashi Joy,    It was a pleasure to see you today at the [facility].     My recommendations after this visit include:    Continue current medications    Call if you have frequent or prolonged A fib    Follow up in 1 year, or sooner if needed    Caity Wilson, CNP  [facility], Electrophysiology  [phone]  EP nurses [phone]

## 2022-10-10 NOTE — PROGRESS NOTES
HEART CARE ELECTROPHYSIOLOGY NOTE      [facility]  [phone]      Assessment/Recommendations   Assessment/Plan:  1.  Atrial fibrillation, ectopic atrial tachycardia: No symptomatology or evidence of recurrence of atrial tachycardia.  No evidence of recurrence of atrial fibrillation since complex left and right atrial ablation in January 2014.   We discussed treatment options of antiarrhythmic medications versus repeat ablation, but he prefers to continue with medications at this time.  Recent EKG shows QTc interval within safety parameters.  Continue sotalol 80 mg twice daily.       He was reassured that atrial rhythm he is are not life-threatening, but carries increased risk for stroke.  He has a [id number] score of 2 for age 65-74 and hypertension, though this is higher due to factor V Leiden deficiency.  He switched over to Eliquis while he was in [place] as he was able to get it affordably.  He denies side effects or missed doses.  He really appreciates not having to get his INRs checked frequently, particularly as he travels.  Continue Eliquis 5 mg twice daily for stroke prophylaxis.     2.  Hypertension: Blood pressure mildly elevated today, but has been consistently at target.  Follow-up with PCP if it continues to be elevated.  Continue amlodipine and sotalol as above.    Follow up in 1 year     History of Present Illness/Subjective    HPI: Tashi Joy is a 66 year old male who comes in today for EP follow-up of atrial arrhythmias.  He has a history of atrial fibrillation and atrial tachycardia, hypertension, and heterozygous factor V Leiden mutation.  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  Since that time, he has had chronic left leg edema.      He has a history of paroxysmal atrial fibrillation since 2002.  He failed antiarrhythmic therapy with flecainide and sotalol.  He underwent pulmonary vein isolation ablation in  "July 2011 with a repeat, complex left and right atrial ablation in January 2014.  He has had no recurrence of atrial fibrillation, but has had recurrence of ectopic atrial tachycardia.  He was hospitalized in November 2017 with atrial tachycardia, initially presenting with a 2:1 block, but ultimately underwent urgent cardioversion for 1:1 conduction associated with profound hypotension.  He was started on sotalol 80 mg twice daily.  He was on warfarin for stroke prophylaxis, but while he was in [place] he saw a cardiologist and switched to Eliquis as he can obtain it affordably there.     Rachel returned last week from spending 2 months in [place].  He states that he has been feeling well.  He has not had any arrhythmia.  He gained weight while he was in [place] and expresses intent to work on weight loss.  He denies chest discomfort, palpitations, abdominal bloating/fullness, shortness of breath, paroxysmal nocturnal dyspnea, orthopnea, lightheadedness, dizziness, presyncope, or syncope.     Cardiographics (EKG personally reviewed):  EKG done 10/10/2022 shows sinus rhythm at 68 bpm, QRS 92 ms, QT/QTc interval measures 422/448 ms  EKG done 11/16/2017 shows atrial flutter versus atrial tachycardia with 2:1 block at 107 bpm     ECHO, Done 3/14/2019 at \[facility]":   Left ventricle ejection fraction is normal. The estimated left ventricular ejection fraction is 55-60%.  No significant wall motion abnormalities.  No significant valvular abnormalities    I have reviewed and updated the patient's Past Medical History, Social History, Family History and Medication List.  Outside records personally reviewed.     Physical Examination  Review of Systems   Vitals: BP (!) 146/76 (BP Location: Left arm, Patient Position: Sitting, Cuff Size: Adult Regular)   Pulse 66   Resp 16   Ht 1.816 m (5' 11.5\")   Wt 124.6 kg (274 lb 12.8 oz)   BMI 37.79 kg/m    BMI= Body mass index is 37.79 kg/m .  Wt Readings from Last 3 Encounters:   10/10/22 " 124.6 kg (274 lb 12.8 oz)   04/05/21 127.5 kg (281 lb)   02/09/21 123.3 kg (271 lb 12.8 oz)       General Appearance:   Alert, well-appearing and in no acute distress.   HEENT: Atraumatic, normocephalic.  No scleral icterus, normal conjunctivae, EOMs intact, PERRL.  Wearing a mask.   Chest/Lungs:   Chest symmetric, spine straight.  Respirations unlabored.  Lungs are clear to auscultation.   Cardiovascular:   Regular rate and rhythm.  Normal first and second heart sounds with no murmurs, rubs, or gallops; radial and posterior tibial pulses are intact, no edema.   Abdomen:  Soft, nondistended, bowel sounds present.   Extremities: No cyanosis or clubbing.   Musculoskeletal: Moves all extremities.     Skin: Warm, dry, intact.    Neurologic: Mood and affect are appropriate.  Alert and oriented to person, place, time, and situation.     ROS: 10 point ROS neg other than the symptoms noted above in the HPI.         Medical History  Surgical History Family History Social History   Past Medical History:   Diagnosis Date     Arthritis      Atrial fibrillation (H)     Symptomatic.  Onset 2002 [id number] risk score = 1 (HTN) Failed sotalol and off flecanide with PVI PVI July 2011 (RF - PVI due to large LSPV 28X10 mm difficult right vein isolation) Re do PVI + lines Jan 2014       Depression      DVT (deep venous thrombosis) (H)      GERD (gastroesophageal reflux disease)     past hx     Hematuria      Heterozygous factor V Leiden mutation (H)      Numbness     in toes of both feet at night     Pain of left leg     from back issues     Schwannoma     lumbar radical     Past Surgical History:   Procedure Laterality Date     ARTHROSCOPY KNEE Right      ARTHROSCOPY SHOULDER ROTATOR CUFF REPAIR Right      EYE SURGERY Left 4/2004    removal of ptergium     HC TRANSURETHRAL ELEC-SURG PROSTATECTOM N/A 2/9/2021    Procedure: CYSTOSCOPY TRANSURETHRAL RESECTION OF THE PROSTATE;  Surgeon: Migue Zhang MD;  Location: [facility]  Main OR;  Service: Urology     HERNIA REPAIR       IR IVC FILTER PLACEMENT  2015     IR IVC FILTER REMOVAL  2015     IR LUMBAR DRAIN PLACEMENT W FLUORO  2015     MT ABLATE HEART DYSRHYTHM FOCUS      Description: Catheter Ablation Atrial Fibrillation;  Recorded: 2014;  Comments: PVI 2011 (RF ablation  LSPV 70N85wg on MRI- Jarrell:  difficult right vein isolation); ; Re do PVI 2014 (PVI - R cryoballoon + CFE + Roof line + CHARANJIT line + CTI line)     MT MENDEZ W/O FACETEC FORAMOT/DSKC  VRT SEG, THORACIC N/A 2015    Procedure: LAMINOPLASTY L4, LAMINECTOMY L3-L5 FOR REMOVAL OF INTRADURAL TUMOR WITH SSEP MONITORING & EMG;  Surgeon: Rosmery Conley MD;  Location: [facility] Main OR;  Service: Spine     Family History   Problem Relation Age of Onset     Glaucoma Father         age 100     Atrial fibrillation Brother         Social History     Socioeconomic History     Marital status: Single     Spouse name: Not on file     Number of children: Not on file     Years of education: Not on file     Highest education level: Not on file   Occupational History     Not on file   Tobacco Use     Smoking status: Former     Packs/day: 1.00     Years: 10.00     Pack years: 10.00     Types: Cigarettes     Quit date: 1986     Years since quittin.1     Smokeless tobacco: Never   Substance and Sexual Activity     Alcohol use: No     Drug use: No     Sexual activity: Not Currently   Other Topics Concern     Not on file   Social History Narrative     Not on file     Social Determinants of Health     Financial Resource Strain: Not on file   Food Insecurity: Not on file   Transportation Needs: Not on file   Physical Activity: Not on file   Stress: Not on file   Social Connections: Not on file   Intimate Partner Violence: Not on file   Housing Stability: Not on file           Medications  Allergies   Current Outpatient Medications   Medication Sig Dispense Refill     acetaminophen (TYLENOL)  500 MG tablet [ACETAMINOPHEN (TYLENOL) 500 MG TABLET] Take 1-2 tablets (500-1,000 mg total) by mouth every 6 (six) hours as needed for pain.  0     amLODIPine (NORVASC) 5 MG tablet Take 1 tablet (5 mg) by mouth daily       apixaban ANTICOAGULANT (ELIQUIS) 5 MG tablet Take 1 tablet (5 mg) by mouth 2 times daily       sotalol (BETAPACE) 80 MG tablet TAKE 1 TABLET(80 MG) BY MOUTH TWICE DAILY 180 tablet 3       Allergies   Allergen Reactions     Penicillins Diarrhea          Lab Results    Chemistry/lipid CBC Cardiac Enzymes/BNP/TSH/INR   Recent Labs   Lab Test 01/05/22  1110   CHOL 212*   HDL 41   *   TRIG 173*     Recent Labs   Lab Test 01/05/22  1110 11/30/20  1633 10/16/18  1112   * 87 127     Recent Labs   Lab Test 04/15/22  1315      POTASSIUM 4.3   CHLORIDE 107   CO2 27      BUN 24*   CR 1.07   GFRESTIMATED 76   FLAAC 8.9     Recent Labs   Lab Test 04/15/22  1315 01/05/22  1110 02/05/21  0813   CR 1.07 0.97 0.99      Recent Labs   Lab Test 02/14/20 2119   WBC 5.5   HGB 14.0   HCT 44.4   MCV 88        Recent Labs   Lab Test 02/14/20 2119   HGB 14.0    No results for input(s): TROPONINI in the last 38554 hours.  No results for input(s): BNP, NTBNPI, NTBNP in the last 72577 hours.  No results for input(s): TSH in the last 24791 hours.  Recent Labs   Lab Test 02/09/21  1352 02/14/20  2119 12/10/18  0407   INR 1.08 2.71* 3.27*

## 2023-06-27 ENCOUNTER — LAB REQUISITION (OUTPATIENT)
Dept: LAB | Facility: CLINIC | Age: 68
End: 2023-06-27

## 2023-06-27 DIAGNOSIS — Z12.5 ENCOUNTER FOR SCREENING FOR MALIGNANT NEOPLASM OF PROSTATE: ICD-10-CM

## 2023-06-27 DIAGNOSIS — I10 ESSENTIAL (PRIMARY) HYPERTENSION: ICD-10-CM

## 2023-06-27 LAB
ALBUMIN SERPL BCG-MCNC: 4.2 G/DL (ref 3.5–5.2)
ALP SERPL-CCNC: 74 U/L (ref 40–129)
ALT SERPL W P-5'-P-CCNC: 16 U/L (ref 0–70)
ANION GAP SERPL CALCULATED.3IONS-SCNC: 10 MMOL/L (ref 7–15)
AST SERPL W P-5'-P-CCNC: 16 U/L (ref 0–45)
BILIRUB SERPL-MCNC: 0.7 MG/DL
BUN SERPL-MCNC: 15.2 MG/DL (ref 8–23)
CALCIUM SERPL-MCNC: 8.8 MG/DL (ref 8.8–10.2)
CHLORIDE SERPL-SCNC: 106 MMOL/L (ref 98–107)
CHOLEST SERPL-MCNC: 174 MG/DL
CREAT SERPL-MCNC: 0.96 MG/DL (ref 0.67–1.17)
DEPRECATED HCO3 PLAS-SCNC: 24 MMOL/L (ref 22–29)
GFR SERPL CREATININE-BSD FRML MDRD: 86 ML/MIN/1.73M2
GLUCOSE SERPL-MCNC: 106 MG/DL (ref 70–99)
HDLC SERPL-MCNC: 45 MG/DL
LDLC SERPL CALC-MCNC: 109 MG/DL
NONHDLC SERPL-MCNC: 129 MG/DL
POTASSIUM SERPL-SCNC: 4.1 MMOL/L (ref 3.4–5.3)
PROT SERPL-MCNC: 6.6 G/DL (ref 6.4–8.3)
PSA SERPL DL<=0.01 NG/ML-MCNC: 0.87 NG/ML (ref 0–4.5)
SODIUM SERPL-SCNC: 140 MMOL/L (ref 136–145)
TRIGL SERPL-MCNC: 98 MG/DL

## 2023-06-27 PROCEDURE — 80053 COMPREHEN METABOLIC PANEL: CPT | Performed by: FAMILY MEDICINE

## 2023-06-27 PROCEDURE — G0103 PSA SCREENING: HCPCS | Performed by: FAMILY MEDICINE

## 2023-06-27 PROCEDURE — 80061 LIPID PANEL: CPT | Performed by: FAMILY MEDICINE

## 2023-07-29 ENCOUNTER — HEALTH MAINTENANCE LETTER (OUTPATIENT)
Age: 68
End: 2023-07-29

## 2023-08-24 ENCOUNTER — LAB REQUISITION (OUTPATIENT)
Dept: LAB | Facility: CLINIC | Age: 68
End: 2023-08-24

## 2023-08-24 DIAGNOSIS — I10 ESSENTIAL (PRIMARY) HYPERTENSION: ICD-10-CM

## 2023-08-24 LAB
ANION GAP SERPL CALCULATED.3IONS-SCNC: 8 MMOL/L (ref 7–15)
BUN SERPL-MCNC: 12.9 MG/DL (ref 8–23)
CALCIUM SERPL-MCNC: 8.7 MG/DL (ref 8.8–10.2)
CHLORIDE SERPL-SCNC: 104 MMOL/L (ref 98–107)
CREAT SERPL-MCNC: 1.02 MG/DL (ref 0.67–1.17)
DEPRECATED HCO3 PLAS-SCNC: 27 MMOL/L (ref 22–29)
GFR SERPL CREATININE-BSD FRML MDRD: 80 ML/MIN/1.73M2
GLUCOSE SERPL-MCNC: 117 MG/DL (ref 70–99)
POTASSIUM SERPL-SCNC: 5.1 MMOL/L (ref 3.4–5.3)
SODIUM SERPL-SCNC: 139 MMOL/L (ref 136–145)

## 2023-08-24 PROCEDURE — 80048 BASIC METABOLIC PNL TOTAL CA: CPT | Performed by: FAMILY MEDICINE

## 2023-11-29 DIAGNOSIS — I48.0 PAROXYSMAL ATRIAL FIBRILLATION (H): ICD-10-CM

## 2023-11-30 RX ORDER — SOTALOL HYDROCHLORIDE 80 MG/1
TABLET ORAL
Qty: 180 TABLET | Refills: 0 | Status: SHIPPED | OUTPATIENT
Start: 2023-11-30 | End: 2024-02-29

## 2024-02-29 DIAGNOSIS — I48.0 PAROXYSMAL ATRIAL FIBRILLATION (H): Primary | ICD-10-CM

## 2024-02-29 DIAGNOSIS — I48.0 PAROXYSMAL ATRIAL FIBRILLATION (H): ICD-10-CM

## 2024-02-29 RX ORDER — SOTALOL HYDROCHLORIDE 80 MG/1
TABLET ORAL
Qty: 180 TABLET | Refills: 0 | Status: SHIPPED | OUTPATIENT
Start: 2024-02-29

## 2024-09-15 ENCOUNTER — HEALTH MAINTENANCE LETTER (OUTPATIENT)
Age: 69
End: 2024-09-15

## 2024-10-01 ENCOUNTER — LAB REQUISITION (OUTPATIENT)
Dept: LAB | Facility: CLINIC | Age: 69
End: 2024-10-01

## 2024-10-01 DIAGNOSIS — E78.5 HYPERLIPIDEMIA, UNSPECIFIED: ICD-10-CM

## 2024-10-01 DIAGNOSIS — Z13.1 ENCOUNTER FOR SCREENING FOR DIABETES MELLITUS: ICD-10-CM

## 2024-10-01 DIAGNOSIS — I10 ESSENTIAL (PRIMARY) HYPERTENSION: ICD-10-CM

## 2024-10-01 PROCEDURE — 80061 LIPID PANEL: CPT | Performed by: FAMILY MEDICINE

## 2024-10-01 PROCEDURE — 80053 COMPREHEN METABOLIC PANEL: CPT | Performed by: FAMILY MEDICINE

## 2024-10-02 LAB
ALBUMIN SERPL BCG-MCNC: 3.9 G/DL (ref 3.5–5.2)
ALP SERPL-CCNC: 78 U/L (ref 40–150)
ALT SERPL W P-5'-P-CCNC: 10 U/L (ref 0–70)
ANION GAP SERPL CALCULATED.3IONS-SCNC: 11 MMOL/L (ref 7–15)
AST SERPL W P-5'-P-CCNC: 14 U/L (ref 0–45)
BILIRUB SERPL-MCNC: 0.6 MG/DL
BUN SERPL-MCNC: 27.8 MG/DL (ref 8–23)
CALCIUM SERPL-MCNC: 8.8 MG/DL (ref 8.8–10.4)
CHLORIDE SERPL-SCNC: 103 MMOL/L (ref 98–107)
CHOLEST SERPL-MCNC: 162 MG/DL
CREAT SERPL-MCNC: 1.29 MG/DL (ref 0.67–1.17)
EGFRCR SERPLBLD CKD-EPI 2021: 60 ML/MIN/1.73M2
FASTING STATUS PATIENT QL REPORTED: ABNORMAL
FASTING STATUS PATIENT QL REPORTED: ABNORMAL
GLUCOSE SERPL-MCNC: 102 MG/DL (ref 70–99)
HCO3 SERPL-SCNC: 23 MMOL/L (ref 22–29)
HDLC SERPL-MCNC: 40 MG/DL
LDLC SERPL CALC-MCNC: 89 MG/DL
NONHDLC SERPL-MCNC: 122 MG/DL
POTASSIUM SERPL-SCNC: 4.3 MMOL/L (ref 3.4–5.3)
PROT SERPL-MCNC: 6.9 G/DL (ref 6.4–8.3)
SODIUM SERPL-SCNC: 137 MMOL/L (ref 135–145)
TRIGL SERPL-MCNC: 165 MG/DL

## 2024-11-01 ENCOUNTER — LAB REQUISITION (OUTPATIENT)
Dept: LAB | Facility: CLINIC | Age: 69
End: 2024-11-01

## 2024-11-01 DIAGNOSIS — I10 ESSENTIAL (PRIMARY) HYPERTENSION: ICD-10-CM

## 2024-11-01 PROCEDURE — 82947 ASSAY GLUCOSE BLOOD QUANT: CPT | Performed by: FAMILY MEDICINE

## 2024-11-01 PROCEDURE — 84155 ASSAY OF PROTEIN SERUM: CPT | Performed by: FAMILY MEDICINE

## 2024-11-02 LAB
ALBUMIN SERPL BCG-MCNC: 4.2 G/DL (ref 3.5–5.2)
ALP SERPL-CCNC: 78 U/L (ref 40–150)
ALT SERPL W P-5'-P-CCNC: 13 U/L (ref 0–70)
ANION GAP SERPL CALCULATED.3IONS-SCNC: 10 MMOL/L (ref 7–15)
AST SERPL W P-5'-P-CCNC: 13 U/L (ref 0–45)
BILIRUB SERPL-MCNC: 0.6 MG/DL
BUN SERPL-MCNC: 19 MG/DL (ref 8–23)
CALCIUM SERPL-MCNC: 9.2 MG/DL (ref 8.8–10.4)
CHLORIDE SERPL-SCNC: 103 MMOL/L (ref 98–107)
CREAT SERPL-MCNC: 0.95 MG/DL (ref 0.67–1.17)
EGFRCR SERPLBLD CKD-EPI 2021: 86 ML/MIN/1.73M2
GLUCOSE SERPL-MCNC: 115 MG/DL (ref 70–99)
HCO3 SERPL-SCNC: 26 MMOL/L (ref 22–29)
POTASSIUM SERPL-SCNC: 4.5 MMOL/L (ref 3.4–5.3)
PROT SERPL-MCNC: 7.4 G/DL (ref 6.4–8.3)
SODIUM SERPL-SCNC: 139 MMOL/L (ref 135–145)